# Patient Record
Sex: MALE | Race: WHITE | NOT HISPANIC OR LATINO | Employment: FULL TIME | URBAN - NONMETROPOLITAN AREA
[De-identification: names, ages, dates, MRNs, and addresses within clinical notes are randomized per-mention and may not be internally consistent; named-entity substitution may affect disease eponyms.]

---

## 2017-01-09 ENCOUNTER — APPOINTMENT (OUTPATIENT)
Dept: LAB | Facility: HOSPITAL | Age: 57
End: 2017-01-09
Attending: INTERNAL MEDICINE

## 2017-01-09 ENCOUNTER — TRANSCRIBE ORDERS (OUTPATIENT)
Dept: ADMINISTRATIVE | Facility: HOSPITAL | Age: 57
End: 2017-01-09

## 2017-01-09 DIAGNOSIS — M86.661 OSTEOMYELITIS, CHRONIC, LOWER LEG, RIGHT (HCC): ICD-10-CM

## 2017-01-09 DIAGNOSIS — B95.61 STAPHYLOCOCCUS AUREUS SUPERFICIAL FOLLICULITIS: Primary | ICD-10-CM

## 2017-01-09 DIAGNOSIS — L73.9 STAPHYLOCOCCUS AUREUS SUPERFICIAL FOLLICULITIS: Primary | ICD-10-CM

## 2017-01-09 LAB
ALBUMIN SERPL-MCNC: 4.2 G/DL (ref 3.5–5)
ALBUMIN/GLOB SERPL: 1.2 G/DL (ref 1.1–2.5)
ALP SERPL-CCNC: 72 U/L (ref 24–120)
ALT SERPL W P-5'-P-CCNC: 40 U/L (ref 0–54)
ANION GAP SERPL CALCULATED.3IONS-SCNC: 13 MMOL/L (ref 4–13)
AST SERPL-CCNC: 24 U/L (ref 7–45)
BASOPHILS # BLD AUTO: 0.04 10*3/MM3 (ref 0–0.2)
BASOPHILS NFR BLD AUTO: 0.6 % (ref 0–2)
BILIRUB SERPL-MCNC: 0.8 MG/DL (ref 0.1–1)
BUN BLD-MCNC: 21 MG/DL (ref 5–21)
BUN/CREAT SERPL: 22.1 (ref 7–25)
CALCIUM SPEC-SCNC: 9 MG/DL (ref 8.4–10.4)
CHLORIDE SERPL-SCNC: 102 MMOL/L (ref 98–110)
CO2 SERPL-SCNC: 28 MMOL/L (ref 24–31)
CREAT BLD-MCNC: 0.95 MG/DL (ref 0.5–1.4)
CRP SERPL-MCNC: <0.5 MG/DL (ref 0–0.99)
DEPRECATED RDW RBC AUTO: 45.9 FL (ref 40–54)
EOSINOPHIL # BLD AUTO: 0.12 10*3/MM3 (ref 0–0.7)
EOSINOPHIL NFR BLD AUTO: 1.8 % (ref 0–4)
ERYTHROCYTE [DISTWIDTH] IN BLOOD BY AUTOMATED COUNT: 14.9 % (ref 12–15)
ERYTHROCYTE [SEDIMENTATION RATE] IN BLOOD: 19 MM/HR (ref 0–15)
GFR SERPL CREATININE-BSD FRML MDRD: 82 ML/MIN/1.73
GLOBULIN UR ELPH-MCNC: 3.5 GM/DL
GLUCOSE BLD-MCNC: 87 MG/DL (ref 70–100)
HCT VFR BLD AUTO: 39.8 % (ref 40–52)
HGB BLD-MCNC: 13.5 G/DL (ref 14–18)
IMM GRANULOCYTES # BLD: 0 10*3/MM3 (ref 0–0.03)
IMM GRANULOCYTES NFR BLD: 0 % (ref 0–5)
LYMPHOCYTES # BLD AUTO: 2.24 10*3/MM3 (ref 0.72–4.86)
LYMPHOCYTES NFR BLD AUTO: 34 % (ref 15–45)
MCH RBC QN AUTO: 29.4 PG (ref 28–32)
MCHC RBC AUTO-ENTMCNC: 33.9 G/DL (ref 33–36)
MCV RBC AUTO: 86.7 FL (ref 82–95)
MONOCYTES # BLD AUTO: 0.83 10*3/MM3 (ref 0.19–1.3)
MONOCYTES NFR BLD AUTO: 12.6 % (ref 4–12)
NEUTROPHILS # BLD AUTO: 3.36 10*3/MM3 (ref 1.87–8.4)
NEUTROPHILS NFR BLD AUTO: 51 % (ref 39–78)
PLATELET # BLD AUTO: 214 10*3/MM3 (ref 130–400)
PMV BLD AUTO: 9.5 FL (ref 6–12)
POTASSIUM BLD-SCNC: 4.4 MMOL/L (ref 3.5–5.3)
PROT SERPL-MCNC: 7.7 G/DL (ref 6.3–8.7)
RBC # BLD AUTO: 4.59 10*6/MM3 (ref 4.8–5.9)
SODIUM BLD-SCNC: 143 MMOL/L (ref 135–145)
WBC NRBC COR # BLD: 6.59 10*3/MM3 (ref 4.8–10.8)

## 2017-01-09 PROCEDURE — 86140 C-REACTIVE PROTEIN: CPT | Performed by: INTERNAL MEDICINE

## 2017-01-09 PROCEDURE — 85025 COMPLETE CBC W/AUTO DIFF WBC: CPT | Performed by: INTERNAL MEDICINE

## 2017-01-09 PROCEDURE — 80053 COMPREHEN METABOLIC PANEL: CPT | Performed by: INTERNAL MEDICINE

## 2017-01-09 PROCEDURE — 36415 COLL VENOUS BLD VENIPUNCTURE: CPT | Performed by: INTERNAL MEDICINE

## 2017-01-09 PROCEDURE — 85651 RBC SED RATE NONAUTOMATED: CPT | Performed by: INTERNAL MEDICINE

## 2017-01-30 ENCOUNTER — APPOINTMENT (OUTPATIENT)
Dept: LAB | Facility: HOSPITAL | Age: 57
End: 2017-01-30
Attending: INTERNAL MEDICINE

## 2017-01-30 ENCOUNTER — TRANSCRIBE ORDERS (OUTPATIENT)
Dept: LAB | Facility: HOSPITAL | Age: 57
End: 2017-01-30

## 2017-01-30 DIAGNOSIS — M86.669: ICD-10-CM

## 2017-01-30 DIAGNOSIS — B95.61 STAPHYLOCOCCUS AUREUS SUPERFICIAL FOLLICULITIS: Primary | ICD-10-CM

## 2017-01-30 DIAGNOSIS — L73.9 STAPHYLOCOCCUS AUREUS SUPERFICIAL FOLLICULITIS: Primary | ICD-10-CM

## 2017-01-30 LAB
CRP SERPL-MCNC: <0.5 MG/DL (ref 0–0.99)
DEPRECATED RDW RBC AUTO: 46.1 FL (ref 40–54)
ERYTHROCYTE [DISTWIDTH] IN BLOOD BY AUTOMATED COUNT: 14.8 % (ref 12–15)
ERYTHROCYTE [SEDIMENTATION RATE] IN BLOOD: 11 MM/HR (ref 0–15)
HCT VFR BLD AUTO: 41.9 % (ref 40–52)
HGB BLD-MCNC: 14.6 G/DL (ref 14–18)
MCH RBC QN AUTO: 30 PG (ref 28–32)
MCHC RBC AUTO-ENTMCNC: 34.8 G/DL (ref 33–36)
MCV RBC AUTO: 86.2 FL (ref 82–95)
PLATELET # BLD AUTO: 187 10*3/MM3 (ref 130–400)
PMV BLD AUTO: 10.3 FL (ref 6–12)
RBC # BLD AUTO: 4.86 10*6/MM3 (ref 4.8–5.9)
WBC NRBC COR # BLD: 7.67 10*3/MM3 (ref 4.8–10.8)

## 2017-01-30 PROCEDURE — 36415 COLL VENOUS BLD VENIPUNCTURE: CPT | Performed by: INTERNAL MEDICINE

## 2017-01-30 PROCEDURE — 85651 RBC SED RATE NONAUTOMATED: CPT | Performed by: INTERNAL MEDICINE

## 2017-01-30 PROCEDURE — 85027 COMPLETE CBC AUTOMATED: CPT | Performed by: INTERNAL MEDICINE

## 2017-01-30 PROCEDURE — 86140 C-REACTIVE PROTEIN: CPT | Performed by: INTERNAL MEDICINE

## 2017-04-10 ENCOUNTER — APPOINTMENT (OUTPATIENT)
Dept: LAB | Facility: HOSPITAL | Age: 57
End: 2017-04-10
Attending: INTERNAL MEDICINE

## 2017-04-10 ENCOUNTER — TRANSCRIBE ORDERS (OUTPATIENT)
Dept: ADMINISTRATIVE | Facility: HOSPITAL | Age: 57
End: 2017-04-10

## 2017-04-10 DIAGNOSIS — M86.669: ICD-10-CM

## 2017-04-10 DIAGNOSIS — B95.61 METHICILLIN SUSCEPTIBLE STAPHYLOCOCCUS AUREUS INFECTION AS THE CAUSE OF DISEASES CLASSIFIED ELSEWHERE: Primary | ICD-10-CM

## 2017-04-10 LAB
ALBUMIN SERPL-MCNC: 4.3 G/DL (ref 3.5–5)
ALBUMIN/GLOB SERPL: 1.2 G/DL (ref 1.1–2.5)
ALP SERPL-CCNC: 82 U/L (ref 24–120)
ALT SERPL W P-5'-P-CCNC: 26 U/L (ref 0–54)
ANION GAP SERPL CALCULATED.3IONS-SCNC: 15 MMOL/L (ref 4–13)
AST SERPL-CCNC: 26 U/L (ref 7–45)
BASOPHILS # BLD AUTO: 0.03 10*3/MM3 (ref 0–0.2)
BASOPHILS NFR BLD AUTO: 0.3 % (ref 0–2)
BILIRUB SERPL-MCNC: 0.5 MG/DL (ref 0.1–1)
BUN BLD-MCNC: 21 MG/DL (ref 5–21)
BUN/CREAT SERPL: 23.3 (ref 7–25)
CALCIUM SPEC-SCNC: 9.9 MG/DL (ref 8.4–10.4)
CHLORIDE SERPL-SCNC: 100 MMOL/L (ref 98–110)
CO2 SERPL-SCNC: 24 MMOL/L (ref 24–31)
CREAT BLD-MCNC: 0.9 MG/DL (ref 0.5–1.4)
CRP SERPL-MCNC: <0.5 MG/DL (ref 0–0.99)
DEPRECATED RDW RBC AUTO: 39.3 FL (ref 40–54)
EOSINOPHIL # BLD AUTO: 0.11 10*3/MM3 (ref 0–0.7)
EOSINOPHIL NFR BLD AUTO: 0.9 % (ref 0–4)
ERYTHROCYTE [DISTWIDTH] IN BLOOD BY AUTOMATED COUNT: 12.8 % (ref 12–15)
GFR SERPL CREATININE-BSD FRML MDRD: 87 ML/MIN/1.73
GLOBULIN UR ELPH-MCNC: 3.5 GM/DL
GLUCOSE BLD-MCNC: 96 MG/DL (ref 70–100)
HCT VFR BLD AUTO: 41.9 % (ref 40–52)
HGB BLD-MCNC: 14.9 G/DL (ref 14–18)
IMM GRANULOCYTES # BLD: 0.05 10*3/MM3 (ref 0–0.03)
IMM GRANULOCYTES NFR BLD: 0.4 % (ref 0–5)
LYMPHOCYTES # BLD AUTO: 2.34 10*3/MM3 (ref 0.72–4.86)
LYMPHOCYTES NFR BLD AUTO: 19.5 % (ref 15–45)
MCH RBC QN AUTO: 30.3 PG (ref 28–32)
MCHC RBC AUTO-ENTMCNC: 35.6 G/DL (ref 33–36)
MCV RBC AUTO: 85.2 FL (ref 82–95)
MONOCYTES # BLD AUTO: 1.27 10*3/MM3 (ref 0.19–1.3)
MONOCYTES NFR BLD AUTO: 10.6 % (ref 4–12)
NEUTROPHILS # BLD AUTO: 8.19 10*3/MM3 (ref 1.87–8.4)
NEUTROPHILS NFR BLD AUTO: 68.3 % (ref 39–78)
NRBC BLD MANUAL-RTO: 0 /100 WBC (ref 0–0)
PLATELET # BLD AUTO: 181 10*3/MM3 (ref 130–400)
PMV BLD AUTO: 9.9 FL (ref 6–12)
POTASSIUM BLD-SCNC: 4.4 MMOL/L (ref 3.5–5.3)
PROT SERPL-MCNC: 7.8 G/DL (ref 6.3–8.7)
RBC # BLD AUTO: 4.92 10*6/MM3 (ref 4.8–5.9)
SODIUM BLD-SCNC: 139 MMOL/L (ref 135–145)
WBC NRBC COR # BLD: 11.99 10*3/MM3 (ref 4.8–10.8)

## 2017-04-10 PROCEDURE — 85025 COMPLETE CBC W/AUTO DIFF WBC: CPT | Performed by: INTERNAL MEDICINE

## 2017-04-10 PROCEDURE — 80053 COMPREHEN METABOLIC PANEL: CPT | Performed by: INTERNAL MEDICINE

## 2017-04-10 PROCEDURE — 86140 C-REACTIVE PROTEIN: CPT | Performed by: INTERNAL MEDICINE

## 2017-04-10 PROCEDURE — 36415 COLL VENOUS BLD VENIPUNCTURE: CPT | Performed by: INTERNAL MEDICINE

## 2017-08-14 ENCOUNTER — TRANSCRIBE ORDERS (OUTPATIENT)
Dept: ADMINISTRATIVE | Facility: HOSPITAL | Age: 57
End: 2017-08-14

## 2017-08-14 ENCOUNTER — APPOINTMENT (OUTPATIENT)
Dept: LAB | Facility: HOSPITAL | Age: 57
End: 2017-08-14
Attending: INTERNAL MEDICINE

## 2017-08-14 ENCOUNTER — HOSPITAL ENCOUNTER (OUTPATIENT)
Dept: GENERAL RADIOLOGY | Facility: HOSPITAL | Age: 57
Discharge: HOME | End: 2017-08-14
Attending: INTERNAL MEDICINE | Admitting: INTERNAL MEDICINE

## 2017-08-14 ENCOUNTER — HOSPITAL ENCOUNTER (OUTPATIENT)
Dept: GENERAL RADIOLOGY | Facility: HOSPITAL | Age: 57
Discharge: HOME | End: 2017-08-14

## 2017-08-14 DIAGNOSIS — B95.61 STAPHYLOCOCCUS AUREUS SUPERFICIAL FOLLICULITIS: Primary | ICD-10-CM

## 2017-08-14 DIAGNOSIS — M86.661 OSTEOMYELITIS, CHRONIC, LOWER LEG, RIGHT (HCC): ICD-10-CM

## 2017-08-14 DIAGNOSIS — L73.9 STAPHYLOCOCCUS AUREUS SUPERFICIAL FOLLICULITIS: Primary | ICD-10-CM

## 2017-08-14 LAB
ALBUMIN SERPL-MCNC: 4.6 G/DL (ref 3.5–5)
ALBUMIN/GLOB SERPL: 1.5 G/DL (ref 1.1–2.5)
ALP SERPL-CCNC: 81 U/L (ref 24–120)
ALT SERPL W P-5'-P-CCNC: 84 U/L (ref 0–54)
ANION GAP SERPL CALCULATED.3IONS-SCNC: 11 MMOL/L (ref 4–13)
AST SERPL-CCNC: 40 U/L (ref 7–45)
BILIRUB SERPL-MCNC: 0.6 MG/DL (ref 0.1–1)
BUN BLD-MCNC: 18 MG/DL (ref 5–21)
BUN/CREAT SERPL: 19.8 (ref 7–25)
CALCIUM SPEC-SCNC: 9.7 MG/DL (ref 8.4–10.4)
CHLORIDE SERPL-SCNC: 104 MMOL/L (ref 98–110)
CO2 SERPL-SCNC: 26 MMOL/L (ref 24–31)
CREAT BLD-MCNC: 0.91 MG/DL (ref 0.5–1.4)
CRP SERPL-MCNC: <0.5 MG/DL (ref 0–0.99)
DEPRECATED RDW RBC AUTO: 43.3 FL (ref 40–54)
ERYTHROCYTE [DISTWIDTH] IN BLOOD BY AUTOMATED COUNT: 13.6 % (ref 12–15)
ERYTHROCYTE [SEDIMENTATION RATE] IN BLOOD: 2 MM/HR (ref 0–15)
GFR SERPL CREATININE-BSD FRML MDRD: 86 ML/MIN/1.73
GLOBULIN UR ELPH-MCNC: 3 GM/DL
GLUCOSE BLD-MCNC: 86 MG/DL (ref 70–100)
HCT VFR BLD AUTO: 41.4 % (ref 40–52)
HGB BLD-MCNC: 13.9 G/DL (ref 14–18)
MCH RBC QN AUTO: 29.3 PG (ref 28–32)
MCHC RBC AUTO-ENTMCNC: 33.6 G/DL (ref 33–36)
MCV RBC AUTO: 87.3 FL (ref 82–95)
PLATELET # BLD AUTO: 179 10*3/MM3 (ref 130–400)
PMV BLD AUTO: 9.9 FL (ref 6–12)
POTASSIUM BLD-SCNC: 4.8 MMOL/L (ref 3.5–5.3)
PROT SERPL-MCNC: 7.6 G/DL (ref 6.3–8.7)
RBC # BLD AUTO: 4.74 10*6/MM3 (ref 4.8–5.9)
SODIUM BLD-SCNC: 141 MMOL/L (ref 135–145)
WBC NRBC COR # BLD: 7.81 10*3/MM3 (ref 4.8–10.8)

## 2017-08-14 PROCEDURE — 73590 X-RAY EXAM OF LOWER LEG: CPT

## 2017-08-14 PROCEDURE — 85027 COMPLETE CBC AUTOMATED: CPT | Performed by: INTERNAL MEDICINE

## 2017-08-14 PROCEDURE — 86140 C-REACTIVE PROTEIN: CPT | Performed by: INTERNAL MEDICINE

## 2017-08-14 PROCEDURE — 85651 RBC SED RATE NONAUTOMATED: CPT | Performed by: INTERNAL MEDICINE

## 2017-08-14 PROCEDURE — 36415 COLL VENOUS BLD VENIPUNCTURE: CPT | Performed by: INTERNAL MEDICINE

## 2017-08-14 PROCEDURE — 80053 COMPREHEN METABOLIC PANEL: CPT | Performed by: INTERNAL MEDICINE

## 2017-11-14 ENCOUNTER — TRANSCRIBE ORDERS (OUTPATIENT)
Dept: ADMINISTRATIVE | Facility: HOSPITAL | Age: 57
End: 2017-11-14

## 2017-11-14 ENCOUNTER — APPOINTMENT (OUTPATIENT)
Dept: LAB | Facility: HOSPITAL | Age: 57
End: 2017-11-14
Attending: INTERNAL MEDICINE

## 2017-11-14 DIAGNOSIS — L73.9 STAPHYLOCOCCUS AUREUS SUPERFICIAL FOLLICULITIS: Primary | ICD-10-CM

## 2017-11-14 DIAGNOSIS — M86.661 OSTEOMYELITIS, CHRONIC, LOWER LEG, RIGHT (HCC): ICD-10-CM

## 2017-11-14 DIAGNOSIS — B95.61 STAPHYLOCOCCUS AUREUS SUPERFICIAL FOLLICULITIS: Primary | ICD-10-CM

## 2017-11-14 LAB
ALBUMIN SERPL-MCNC: 4.5 G/DL (ref 3.5–5)
ALBUMIN/GLOB SERPL: 1.5 G/DL (ref 1.1–2.5)
ALP SERPL-CCNC: 73 U/L (ref 24–120)
ALT SERPL W P-5'-P-CCNC: 62 U/L (ref 0–54)
ANION GAP SERPL CALCULATED.3IONS-SCNC: 12 MMOL/L (ref 4–13)
AST SERPL-CCNC: 30 U/L (ref 7–45)
BASOPHILS # BLD AUTO: 0.04 10*3/MM3 (ref 0–0.2)
BASOPHILS NFR BLD AUTO: 0.6 % (ref 0–2)
BILIRUB SERPL-MCNC: 0.4 MG/DL (ref 0.1–1)
BUN BLD-MCNC: 22 MG/DL (ref 5–21)
BUN/CREAT SERPL: 23.7 (ref 7–25)
CALCIUM SPEC-SCNC: 9.6 MG/DL (ref 8.4–10.4)
CHLORIDE SERPL-SCNC: 102 MMOL/L (ref 98–110)
CO2 SERPL-SCNC: 30 MMOL/L (ref 24–31)
CREAT BLD-MCNC: 0.93 MG/DL (ref 0.5–1.4)
CRP SERPL-MCNC: <0.5 MG/DL (ref 0–0.99)
DEPRECATED RDW RBC AUTO: 43.2 FL (ref 40–54)
EOSINOPHIL # BLD AUTO: 0.2 10*3/MM3 (ref 0–0.7)
EOSINOPHIL NFR BLD AUTO: 2.8 % (ref 0–4)
ERYTHROCYTE [DISTWIDTH] IN BLOOD BY AUTOMATED COUNT: 13.6 % (ref 12–15)
GFR SERPL CREATININE-BSD FRML MDRD: 84 ML/MIN/1.73
GLOBULIN UR ELPH-MCNC: 3.1 GM/DL
GLUCOSE BLD-MCNC: 79 MG/DL (ref 70–100)
HCT VFR BLD AUTO: 41.6 % (ref 40–52)
HGB BLD-MCNC: 14.1 G/DL (ref 14–18)
IMM GRANULOCYTES # BLD: 0.02 10*3/MM3 (ref 0–0.03)
IMM GRANULOCYTES NFR BLD: 0.3 % (ref 0–5)
LYMPHOCYTES # BLD AUTO: 2.64 10*3/MM3 (ref 0.72–4.86)
LYMPHOCYTES NFR BLD AUTO: 36.7 % (ref 15–45)
MCH RBC QN AUTO: 29.7 PG (ref 28–32)
MCHC RBC AUTO-ENTMCNC: 33.9 G/DL (ref 33–36)
MCV RBC AUTO: 87.6 FL (ref 82–95)
MONOCYTES # BLD AUTO: 0.82 10*3/MM3 (ref 0.19–1.3)
MONOCYTES NFR BLD AUTO: 11.4 % (ref 4–12)
NEUTROPHILS # BLD AUTO: 3.48 10*3/MM3 (ref 1.87–8.4)
NEUTROPHILS NFR BLD AUTO: 48.2 % (ref 39–78)
PLATELET # BLD AUTO: 184 10*3/MM3 (ref 130–400)
PMV BLD AUTO: 10 FL (ref 6–12)
POTASSIUM BLD-SCNC: 4.8 MMOL/L (ref 3.5–5.3)
PROT SERPL-MCNC: 7.6 G/DL (ref 6.3–8.7)
RBC # BLD AUTO: 4.75 10*6/MM3 (ref 4.8–5.9)
SODIUM BLD-SCNC: 144 MMOL/L (ref 135–145)
WBC NRBC COR # BLD: 7.2 10*3/MM3 (ref 4.8–10.8)

## 2017-11-14 PROCEDURE — 85025 COMPLETE CBC W/AUTO DIFF WBC: CPT | Performed by: INTERNAL MEDICINE

## 2017-11-14 PROCEDURE — 36415 COLL VENOUS BLD VENIPUNCTURE: CPT

## 2017-11-14 PROCEDURE — 86140 C-REACTIVE PROTEIN: CPT | Performed by: INTERNAL MEDICINE

## 2017-11-14 PROCEDURE — 80053 COMPREHEN METABOLIC PANEL: CPT | Performed by: INTERNAL MEDICINE

## 2018-03-19 ENCOUNTER — APPOINTMENT (OUTPATIENT)
Dept: LAB | Facility: HOSPITAL | Age: 58
End: 2018-03-19
Attending: INTERNAL MEDICINE

## 2018-03-19 ENCOUNTER — TRANSCRIBE ORDERS (OUTPATIENT)
Dept: LAB | Facility: HOSPITAL | Age: 58
End: 2018-03-19

## 2018-03-19 DIAGNOSIS — L73.9 STAPHYLOCOCCUS AUREUS SUPERFICIAL FOLLICULITIS: Primary | ICD-10-CM

## 2018-03-19 DIAGNOSIS — B95.61 STAPHYLOCOCCUS AUREUS SUPERFICIAL FOLLICULITIS: Primary | ICD-10-CM

## 2018-03-19 DIAGNOSIS — M86.661 OSTEOMYELITIS, CHRONIC, LOWER LEG, RIGHT (HCC): ICD-10-CM

## 2018-03-19 LAB
ALBUMIN SERPL-MCNC: 4.4 G/DL (ref 3.5–5)
ALBUMIN/GLOB SERPL: 1.2 G/DL (ref 1.1–2.5)
ALP SERPL-CCNC: 76 U/L (ref 24–120)
ALT SERPL W P-5'-P-CCNC: 31 U/L (ref 0–54)
ANION GAP SERPL CALCULATED.3IONS-SCNC: 15 MMOL/L (ref 4–13)
AST SERPL-CCNC: 26 U/L (ref 7–45)
BASOPHILS # BLD AUTO: 0.05 10*3/MM3 (ref 0–0.2)
BASOPHILS NFR BLD AUTO: 0.5 % (ref 0–2)
BILIRUB SERPL-MCNC: 0.4 MG/DL (ref 0.1–1)
BUN BLD-MCNC: 19 MG/DL (ref 5–21)
BUN/CREAT SERPL: 21.6 (ref 7–25)
CALCIUM SPEC-SCNC: 9.5 MG/DL (ref 8.4–10.4)
CHLORIDE SERPL-SCNC: 100 MMOL/L (ref 98–110)
CO2 SERPL-SCNC: 29 MMOL/L (ref 24–31)
CREAT BLD-MCNC: 0.88 MG/DL (ref 0.5–1.4)
CRP SERPL-MCNC: <0.5 MG/DL (ref 0–0.99)
DEPRECATED RDW RBC AUTO: 41.7 FL (ref 40–54)
EOSINOPHIL # BLD AUTO: 0.11 10*3/MM3 (ref 0–0.7)
EOSINOPHIL NFR BLD AUTO: 1.2 % (ref 0–4)
ERYTHROCYTE [DISTWIDTH] IN BLOOD BY AUTOMATED COUNT: 13.5 % (ref 12–15)
ERYTHROCYTE [SEDIMENTATION RATE] IN BLOOD: 17 MM/HR (ref 0–15)
GFR SERPL CREATININE-BSD FRML MDRD: 89 ML/MIN/1.73
GLOBULIN UR ELPH-MCNC: 3.6 GM/DL
GLUCOSE BLD-MCNC: 98 MG/DL (ref 70–100)
HCT VFR BLD AUTO: 41.5 % (ref 40–52)
HGB BLD-MCNC: 13.9 G/DL (ref 14–18)
IMM GRANULOCYTES # BLD: 0.03 10*3/MM3 (ref 0–0.03)
IMM GRANULOCYTES NFR BLD: 0.3 % (ref 0–5)
LYMPHOCYTES # BLD AUTO: 2.62 10*3/MM3 (ref 0.72–4.86)
LYMPHOCYTES NFR BLD AUTO: 27.5 % (ref 15–45)
MCH RBC QN AUTO: 28.3 PG (ref 28–32)
MCHC RBC AUTO-ENTMCNC: 33.5 G/DL (ref 33–36)
MCV RBC AUTO: 84.3 FL (ref 82–95)
MONOCYTES # BLD AUTO: 0.82 10*3/MM3 (ref 0.19–1.3)
MONOCYTES NFR BLD AUTO: 8.6 % (ref 4–12)
NEUTROPHILS # BLD AUTO: 5.89 10*3/MM3 (ref 1.87–8.4)
NEUTROPHILS NFR BLD AUTO: 61.9 % (ref 39–78)
NRBC BLD MANUAL-RTO: 0 /100 WBC (ref 0–0)
PLATELET # BLD AUTO: 198 10*3/MM3 (ref 130–400)
PMV BLD AUTO: 9.5 FL (ref 6–12)
POTASSIUM BLD-SCNC: 4.5 MMOL/L (ref 3.5–5.3)
PROT SERPL-MCNC: 8 G/DL (ref 6.3–8.7)
RBC # BLD AUTO: 4.92 10*6/MM3 (ref 4.8–5.9)
SODIUM BLD-SCNC: 144 MMOL/L (ref 135–145)
WBC NRBC COR # BLD: 9.52 10*3/MM3 (ref 4.8–10.8)

## 2018-03-19 PROCEDURE — 85025 COMPLETE CBC W/AUTO DIFF WBC: CPT | Performed by: INTERNAL MEDICINE

## 2018-03-19 PROCEDURE — 86140 C-REACTIVE PROTEIN: CPT | Performed by: INTERNAL MEDICINE

## 2018-03-19 PROCEDURE — 80053 COMPREHEN METABOLIC PANEL: CPT | Performed by: INTERNAL MEDICINE

## 2018-03-19 PROCEDURE — 85651 RBC SED RATE NONAUTOMATED: CPT | Performed by: INTERNAL MEDICINE

## 2018-03-19 PROCEDURE — 36415 COLL VENOUS BLD VENIPUNCTURE: CPT

## 2018-07-16 ENCOUNTER — TRANSCRIBE ORDERS (OUTPATIENT)
Dept: ADMINISTRATIVE | Facility: HOSPITAL | Age: 58
End: 2018-07-16

## 2018-07-16 ENCOUNTER — APPOINTMENT (OUTPATIENT)
Dept: LAB | Facility: HOSPITAL | Age: 58
End: 2018-07-16
Attending: INTERNAL MEDICINE

## 2018-07-16 DIAGNOSIS — M86.661 OSTEOMYELITIS, CHRONIC, LOWER LEG, RIGHT (HCC): ICD-10-CM

## 2018-07-16 DIAGNOSIS — B95.61 STAPHYLOCOCCUS AUREUS SUPERFICIAL FOLLICULITIS: Primary | ICD-10-CM

## 2018-07-16 DIAGNOSIS — L73.9 STAPHYLOCOCCUS AUREUS SUPERFICIAL FOLLICULITIS: Primary | ICD-10-CM

## 2018-07-16 LAB
ALBUMIN SERPL-MCNC: 4.4 G/DL (ref 3.5–5)
ALBUMIN/GLOB SERPL: 1.5 G/DL (ref 1.1–2.5)
ALP SERPL-CCNC: 89 U/L (ref 24–120)
ALT SERPL W P-5'-P-CCNC: 31 U/L (ref 0–54)
ANION GAP SERPL CALCULATED.3IONS-SCNC: 15 MMOL/L (ref 4–13)
AST SERPL-CCNC: 25 U/L (ref 7–45)
BASOPHILS # BLD AUTO: 0.05 10*3/MM3 (ref 0–0.2)
BASOPHILS NFR BLD AUTO: 0.6 % (ref 0–2)
BILIRUB SERPL-MCNC: 0.5 MG/DL (ref 0.1–1)
BUN BLD-MCNC: 13 MG/DL (ref 5–21)
BUN/CREAT SERPL: 14.1 (ref 7–25)
CALCIUM SPEC-SCNC: 9.5 MG/DL (ref 8.4–10.4)
CHLORIDE SERPL-SCNC: 103 MMOL/L (ref 98–110)
CO2 SERPL-SCNC: 26 MMOL/L (ref 24–31)
CREAT BLD-MCNC: 0.92 MG/DL (ref 0.5–1.4)
DEPRECATED RDW RBC AUTO: 42 FL (ref 40–54)
EOSINOPHIL # BLD AUTO: 0.16 10*3/MM3 (ref 0–0.7)
EOSINOPHIL NFR BLD AUTO: 1.9 % (ref 0–4)
ERYTHROCYTE [DISTWIDTH] IN BLOOD BY AUTOMATED COUNT: 13.7 % (ref 12–15)
GFR SERPL CREATININE-BSD FRML MDRD: 84 ML/MIN/1.73
GLOBULIN UR ELPH-MCNC: 3 GM/DL
GLUCOSE BLD-MCNC: 90 MG/DL (ref 70–100)
HCT VFR BLD AUTO: 41.5 % (ref 40–52)
HGB BLD-MCNC: 13.8 G/DL (ref 14–18)
IMM GRANULOCYTES # BLD: 0.04 10*3/MM3 (ref 0–0.03)
IMM GRANULOCYTES NFR BLD: 0.5 % (ref 0–5)
LYMPHOCYTES # BLD AUTO: 2.76 10*3/MM3 (ref 0.72–4.86)
LYMPHOCYTES NFR BLD AUTO: 32.4 % (ref 15–45)
MCH RBC QN AUTO: 28 PG (ref 28–32)
MCHC RBC AUTO-ENTMCNC: 33.3 G/DL (ref 33–36)
MCV RBC AUTO: 84.3 FL (ref 82–95)
MONOCYTES # BLD AUTO: 0.82 10*3/MM3 (ref 0.19–1.3)
MONOCYTES NFR BLD AUTO: 9.6 % (ref 4–12)
NEUTROPHILS # BLD AUTO: 4.69 10*3/MM3 (ref 1.87–8.4)
NEUTROPHILS NFR BLD AUTO: 55 % (ref 39–78)
NRBC BLD MANUAL-RTO: 0 /100 WBC (ref 0–0)
PLATELET # BLD AUTO: 206 10*3/MM3 (ref 130–400)
PMV BLD AUTO: 9.9 FL (ref 6–12)
POTASSIUM BLD-SCNC: 4.4 MMOL/L (ref 3.5–5.3)
PROT SERPL-MCNC: 7.4 G/DL (ref 6.3–8.7)
RBC # BLD AUTO: 4.92 10*6/MM3 (ref 4.8–5.9)
SODIUM BLD-SCNC: 144 MMOL/L (ref 135–145)
WBC NRBC COR # BLD: 8.52 10*3/MM3 (ref 4.8–10.8)

## 2018-07-16 PROCEDURE — 80053 COMPREHEN METABOLIC PANEL: CPT | Performed by: INTERNAL MEDICINE

## 2018-07-16 PROCEDURE — 36415 COLL VENOUS BLD VENIPUNCTURE: CPT

## 2018-07-16 PROCEDURE — 85025 COMPLETE CBC W/AUTO DIFF WBC: CPT | Performed by: INTERNAL MEDICINE

## 2019-01-07 ENCOUNTER — TRANSCRIBE ORDERS (OUTPATIENT)
Dept: ADMINISTRATIVE | Facility: HOSPITAL | Age: 59
End: 2019-01-07

## 2019-01-07 ENCOUNTER — APPOINTMENT (OUTPATIENT)
Dept: LAB | Facility: HOSPITAL | Age: 59
End: 2019-01-07
Attending: INTERNAL MEDICINE

## 2019-01-07 DIAGNOSIS — M86.661 OSTEOMYELITIS, CHRONIC, LOWER LEG, RIGHT (HCC): ICD-10-CM
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
## 2019-01-07 DIAGNOSIS — B95.61 HEALTHCARE ASSOCIATED BACTEREMIA DUE TO STAPHYLOCOCCUS AUREUS: Primary | ICD-10-CM

## 2019-01-07 DIAGNOSIS — R78.81 HEALTHCARE ASSOCIATED BACTEREMIA DUE TO STAPHYLOCOCCUS AUREUS: Primary | ICD-10-CM

## 2019-01-07 LAB
ALBUMIN SERPL-MCNC: 4.5 G/DL (ref 3.5–5)
ALBUMIN/GLOB SERPL: 1.3 G/DL (ref 1.1–2.5)
ALP SERPL-CCNC: 77 U/L (ref 24–120)
ALT SERPL W P-5'-P-CCNC: 29 U/L (ref 0–54)
ANION GAP SERPL CALCULATED.3IONS-SCNC: 9 MMOL/L (ref 4–13)
AST SERPL-CCNC: 30 U/L (ref 7–45)
BILIRUB SERPL-MCNC: 0.4 MG/DL (ref 0.1–1)
BUN BLD-MCNC: 18 MG/DL (ref 5–21)
BUN/CREAT SERPL: 20 (ref 7–25)
CALCIUM SPEC-SCNC: 9.7 MG/DL (ref 8.4–10.4)
CHLORIDE SERPL-SCNC: 100 MMOL/L (ref 98–110)
CO2 SERPL-SCNC: 30 MMOL/L (ref 24–31)
CREAT BLD-MCNC: 0.9 MG/DL (ref 0.5–1.4)
CRP SERPL-MCNC: <0.5 MG/DL (ref 0–0.99)
DEPRECATED RDW RBC AUTO: 41.2 FL (ref 40–54)
ERYTHROCYTE [DISTWIDTH] IN BLOOD BY AUTOMATED COUNT: 13.2 % (ref 12–15)
ERYTHROCYTE [SEDIMENTATION RATE] IN BLOOD: 4 MM/HR (ref 0–15)
GFR SERPL CREATININE-BSD FRML MDRD: 86 ML/MIN/1.73
GLOBULIN UR ELPH-MCNC: 3.4 GM/DL
GLUCOSE BLD-MCNC: 81 MG/DL (ref 70–100)
HCT VFR BLD AUTO: 43.7 % (ref 40–52)
HGB BLD-MCNC: 15 G/DL (ref 14–18)
MCH RBC QN AUTO: 29.4 PG (ref 28–32)
MCHC RBC AUTO-ENTMCNC: 34.3 G/DL (ref 33–36)
MCV RBC AUTO: 85.7 FL (ref 82–95)
PLATELET # BLD AUTO: 202 10*3/MM3 (ref 130–400)
PMV BLD AUTO: 9.7 FL (ref 6–12)
POTASSIUM BLD-SCNC: 4.4 MMOL/L (ref 3.5–5.3)
PROT SERPL-MCNC: 7.9 G/DL (ref 6.3–8.7)
RBC # BLD AUTO: 5.1 10*6/MM3 (ref 4.8–5.9)
SODIUM BLD-SCNC: 139 MMOL/L (ref 135–145)
WBC NRBC COR # BLD: 8.82 10*3/MM3 (ref 4.8–10.8)

## 2019-01-07 PROCEDURE — 80053 COMPREHEN METABOLIC PANEL: CPT | Performed by: INTERNAL MEDICINE

## 2019-01-07 PROCEDURE — 36415 COLL VENOUS BLD VENIPUNCTURE: CPT

## 2019-01-07 PROCEDURE — 85651 RBC SED RATE NONAUTOMATED: CPT | Performed by: INTERNAL MEDICINE

## 2019-01-07 PROCEDURE — 85027 COMPLETE CBC AUTOMATED: CPT | Performed by: INTERNAL MEDICINE

## 2019-01-07 PROCEDURE — 86140 C-REACTIVE PROTEIN: CPT | Performed by: INTERNAL MEDICINE

## 2023-12-11 ENCOUNTER — TELEPHONE (OUTPATIENT)
Age: 63
End: 2023-12-11
Payer: COMMERCIAL

## 2023-12-11 ENCOUNTER — LAB (OUTPATIENT)
Dept: LAB | Facility: HOSPITAL | Age: 63
End: 2023-12-11
Payer: COMMERCIAL

## 2023-12-11 DIAGNOSIS — M86.461 CHRONIC OSTEOMYELITIS OF RIGHT TIBIA WITH DRAINING SINUS: ICD-10-CM

## 2023-12-11 DIAGNOSIS — M86.461 CHRONIC OSTEOMYELITIS OF RIGHT TIBIA WITH DRAINING SINUS: Primary | ICD-10-CM

## 2023-12-11 LAB
ALBUMIN SERPL-MCNC: 4.2 G/DL (ref 3.5–5)
ALBUMIN/GLOB SERPL: 1.3 G/DL (ref 1.1–2.5)
ALP SERPL-CCNC: 97 U/L (ref 24–120)
ALT SERPL W P-5'-P-CCNC: 31 U/L (ref 0–50)
ANION GAP SERPL CALCULATED.3IONS-SCNC: 10 MMOL/L (ref 4–13)
AST SERPL-CCNC: 24 U/L (ref 7–45)
AUTO MIXED CELLS #: 0.5 10*3/MM3 (ref 0.1–2.6)
AUTO MIXED CELLS %: 5.9 % (ref 0.1–24)
BILIRUB SERPL-MCNC: 0.7 MG/DL (ref 0.1–1)
BUN SERPL-MCNC: 17 MG/DL (ref 5–21)
BUN/CREAT SERPL: 17.2
CALCIUM SPEC-SCNC: 9.2 MG/DL (ref 8.4–10.4)
CHLORIDE SERPL-SCNC: 100 MMOL/L (ref 98–110)
CO2 SERPL-SCNC: 26 MMOL/L (ref 24–31)
CREAT SERPL-MCNC: 0.99 MG/DL (ref 0.5–1.4)
EGFRCR SERPLBLD CKD-EPI 2021: 85.1 ML/MIN/1.73
ERYTHROCYTE [DISTWIDTH] IN BLOOD BY AUTOMATED COUNT: 13.5 % (ref 12.3–15.4)
GLOBULIN UR ELPH-MCNC: 3.3 GM/DL
GLUCOSE SERPL-MCNC: 174 MG/DL (ref 70–100)
HCT VFR BLD AUTO: 41.6 % (ref 37.5–51)
HGB BLD-MCNC: 13.5 G/DL (ref 13–17.7)
LYMPHOCYTES # BLD AUTO: 2.8 10*3/MM3 (ref 0.7–3.1)
LYMPHOCYTES NFR BLD AUTO: 33.5 % (ref 19.6–45.3)
MCH RBC QN AUTO: 28.4 PG (ref 26.6–33)
MCHC RBC AUTO-ENTMCNC: 32.5 G/DL (ref 31.5–35.7)
MCV RBC AUTO: 87.4 FL (ref 79–97)
NEUTROPHILS NFR BLD AUTO: 5 10*3/MM3 (ref 1.7–7)
NEUTROPHILS NFR BLD AUTO: 60.6 % (ref 42.7–76)
PLATELET # BLD AUTO: 210 10*3/MM3 (ref 140–450)
PMV BLD AUTO: 8 FL (ref 6–12)
POTASSIUM SERPL-SCNC: 3.8 MMOL/L (ref 3.5–5.3)
PROT SERPL-MCNC: 7.5 G/DL (ref 6.3–8.7)
RBC # BLD AUTO: 4.76 10*6/MM3 (ref 4.14–5.8)
SODIUM SERPL-SCNC: 136 MMOL/L (ref 135–145)
WBC NRBC COR # BLD AUTO: 8.3 10*3/MM3 (ref 3.4–10.8)

## 2023-12-11 PROCEDURE — 36415 COLL VENOUS BLD VENIPUNCTURE: CPT

## 2023-12-11 PROCEDURE — 86140 C-REACTIVE PROTEIN: CPT

## 2023-12-11 PROCEDURE — 85025 COMPLETE CBC W/AUTO DIFF WBC: CPT

## 2023-12-11 PROCEDURE — 80053 COMPREHEN METABOLIC PANEL: CPT

## 2023-12-11 NOTE — TELEPHONE ENCOUNTER
Patient called stating that he was at [facility] for labs. He stated that they did not have orders I told him that I would send orders.

## 2023-12-12 LAB — CRP SERPL-MCNC: 0.38 MG/DL (ref 0–0.5)

## 2023-12-14 ENCOUNTER — OFFICE VISIT (OUTPATIENT)
Age: 63
End: 2023-12-14
Payer: COMMERCIAL

## 2023-12-14 ENCOUNTER — HOSPITAL ENCOUNTER (OUTPATIENT)
Dept: CARDIOLOGY | Facility: HOSPITAL | Age: 63
Discharge: HOME | End: 2023-12-14
Payer: COMMERCIAL

## 2023-12-14 VITALS
SYSTOLIC BLOOD PRESSURE: 142 MMHG | HEART RATE: 48 BPM | DIASTOLIC BLOOD PRESSURE: 72 MMHG | OXYGEN SATURATION: 98 % | HEIGHT: 68 IN | BODY MASS INDEX: 34.83 KG/M2 | WEIGHT: 229.8 LBS | TEMPERATURE: 98.7 F

## 2023-12-14 DIAGNOSIS — I49.9 IRREGULAR HEARTBEAT: ICD-10-CM

## 2023-12-14 DIAGNOSIS — M86.461 CHRONIC OSTEOMYELITIS OF RIGHT TIBIA WITH DRAINING SINUS: Primary | ICD-10-CM

## 2023-12-14 PROBLEM — I10 HYPERTENSIVE DISORDER: Status: ACTIVE | Noted: 2023-07-27

## 2023-12-14 PROCEDURE — 93005 ELECTROCARDIOGRAM TRACING: CPT | Performed by: INTERNAL MEDICINE

## 2023-12-14 RX ORDER — CEPHALEXIN 500 MG/1
2 CAPSULE ORAL EVERY 12 HOURS
COMMUNITY
End: 2023-12-14 | Stop reason: SDUPTHER

## 2023-12-14 RX ORDER — ASCORBIC ACID 500 MG
500 TABLET ORAL DAILY
COMMUNITY

## 2023-12-14 RX ORDER — CEPHALEXIN 500 MG/1
1000 CAPSULE ORAL EVERY 12 HOURS
Qty: 120 CAPSULE | Refills: 4 | Status: SHIPPED | OUTPATIENT
Start: 2023-12-14 | End: 2024-01-13

## 2023-12-14 RX ORDER — LOSARTAN POTASSIUM 25 MG/1
25 TABLET ORAL DAILY
COMMUNITY

## 2023-12-14 RX ORDER — CETIRIZINE HYDROCHLORIDE 10 MG/1
10 TABLET ORAL DAILY
COMMUNITY

## 2023-12-14 RX ORDER — ASCORBIC ACID 125 MG
1 TABLET,CHEWABLE ORAL DAILY
COMMUNITY

## 2023-12-14 RX ORDER — CYANOCOBALAMIN (VITAMIN B-12) 1000 MCG
1000 TABLET, EXTENDED RELEASE ORAL DAILY
COMMUNITY

## 2023-12-14 NOTE — PROGRESS NOTES
[facility] [place]ious [place]    Patient:  Lemuel Helm  YOB: 1960  MRN: [id number]   Primary Care Physician: Vince Spangler MD  Referring Physician: Vince Spangler MD     Chief Complaint:   Chief Complaint   Patient presents with    chronic right tibial osteomyelitis     With complaints of chronic drainage     Interval History/HPI: He returns today for follow-up of chronic osteomyelitis involving the right mid tibial area.  He indicates the 2 areas that will drain anteriorly persist.  He has not had any new areas develop.  He indicates the most superior incision will drain sometimes.  When that happens typically the lower area of drainage has closed.  He indicates sometimes will then switch in the lower area will drain and the upper area will close.  He has not noticed any increasing redness.  He has not noticed warmth or fluctuance.  He does not seem to have significant pain or discomfort.  Findings on exam and his clinical course seems to be stable on his suppressive treatment with Keflex.  He is tolerating Keflex 1000 mg orally every 12 hours without any nausea, diarrhea, rash, or skin itching.  Today while taking vitals my nurse noticed his heart rate was a little bit irregular.  His heart rate was in the high 40s.  His blood pressure is stable.  He is not having any chest pain.  He has no dizziness or lightheadedness.  He has no orthostatic symptoms.  He has not noticed any palpitations.  He does indicate his mother has a history of heart disease.  He sees Dr. Devi for primary care.    Allergies:   Allergies   Allergen Reactions    Sulfa Antibiotics Rash     Current Scheduled Medications:   Current Outpatient Medications on File Prior to Visit   Medication Sig    ascorbic acid (VITAMIN C) 500 MG tablet Take 1 tablet by mouth Daily.    ASPIRIN 81 PO Take 81 mg by mouth Daily.    cetirizine (zyrTEC) 10 MG tablet Take 1 tablet by mouth Daily.    Cyanocobalamin (Vitamin B-12  "ER) 1000 MCG tablet controlled-release Take 1,000 mcg by mouth Daily.    losartan (COZAAR) 25 MG tablet Take 1 tablet by mouth Daily.    Misc Natural Products (YumVs Beet Root-Tart Cherry) 250-0.5 MG chewable tablet Chew 1 Units Daily.    Naproxen (NAPROSYN PO) Take 1 tablet by mouth Daily As Needed. Takes Monday through Friday    [DISCONTINUED] cephalexin (KEFLEX) 500 MG capsule Take 2 capsules by mouth Every 12 (Twelve) Hours.     No current facility-administered medications on file prior to visit.      Venous Access Review  Line/IV site: No current IV Access    Antimicrobial Review  Currently on antibiotics/antifungals: YES/NO: YES  Start Date of Therapy: Amoxicillin 4/11/17-12/2020  cephalexin 12/20/20 - current   If therapy completed, date complete: NA    Review of Systems See HPI.    Vital Signs:  /72 Comment: manual right arm  Pulse (!) 48   Temp 98.7 °F (37.1 °C) (Temporal)   Ht 172.7 cm (68\")   Wt 104 kg (229 lb 12.8 oz)   SpO2 98%   BMI 34.94 kg/m²     Physical Exam  Vital signs - reviewed.  Alert, pleasant, no distress  When I auscultate his heart it is slightly irregular.  It was hard to tell whether it was an occasional PAC or PAC with intervening periods of regularity.  He could potentially have A-fib.  I could not tell auscultating or palpating his pulse with certainty the nature of the occasional irregularity in his heart rate.  Examination of the right lower extremity shows stable findings from previous exam.  There are 2 areas anteriorly which drained intermittently  There is a minimal amount of serous minimally cloudy drainage on exam today.  This is very stable with past exams.    Lab/Imaging/Other Information:  Reviewed his labs from December 11, 2023 with him.  His CMP, CBC and CRP remain stable.  Comprehensive Metabolic Panel (12/11/2023 14:21)   C-reactive Protein (12/11/2023 14:21)   CBC Auto Differential (12/11/2023 14:21)     Impression & Recommendations:   Diagnoses and all " orders for this visit:    1. Chronic osteomyelitis of right tibia with draining sinus (Primary)  -     CBC Auto Differential; Future  -     Comprehensive Metabolic Panel; Future  -     C-reactive Protein; Future  -     cephalexin (KEFLEX) 500 MG capsule; Take 2 capsules by mouth Every 12 (Twelve) Hours for 30 days.  Dispense: 120 capsule; Refill: 4    2. Irregular heartbeat  -     ECG 12 Lead; Future    From a chronic osteomyelitis standpoint involving the right tibia he seems to be doing very well.  He is tolerating and showing no side effects or problems with Keflex treatment.  Feel the best approach would be to continue his chronic suppression with Keflex.  I feel if we stop his antibiotic therapy that he will have increasing discomfort, purulent drainage, redness, and potentially more draining areas.  We seem to be suppressing any progression in symptoms and holding her own with his current antibiotic treatment.  He does not have any cardiac symptoms.  I cannot discern the nature of his heart rate/heart regularity on bedside exam.  Suggested we send him for a twelve-lead EKG at the hospital and share the results with his primary care provider to make sure there is not a rhythm disturbance of some type that would require different intervention.  He is agreeable.  Continue Keflex.  Lab a few days prior to follow-up.  Follow-up in 6 months.  We will call him with EKG results later today  He will call for an earlier follow-up appointment if any new or worsening symptoms in the meantime.    Follow Up:   Patient Instructions   Continue Keflex 1000 mg q12   Get EKG [facility] at [facility]  Return in about 6 months (around 6/14/2024).  Patient was provided After Visit Summary.     Addendum: 2:20 PM on December 14, 2023:  Now have his ECG available for review.  He is in normal sinus rhythm with a ventricular rate of 72 bpm.  He has an occasional PVC.  There was nonspecific T wave abnormality.  Contacted him by phone.  Explained PVC  was the explanation for the abnormality in heart rhythm we were detecting in the office.  I was trying to make sure he did not have atrial fibrillation that would require different intervention.  Explained I do not feel there is anything different to do at this point in time with these EKG results.  Encouraged him to maybe minimize any caffeine containing products that might offer some additional stimulation.  Told him we would fax a copy of the ECG to his primary care physician's office (Dr. Spangler).  I will be seeing him again in 6 months.  Would be happy to see him sooner if any new or worsening problems in the interim.  He was comfortable with that plan.      Remigio Serrano MD    CC: Vince Spangler MD

## 2023-12-14 NOTE — PATIENT INSTRUCTIONS
Continue Keflex 1000 mg q12   Get EKG today at [facility]

## 2023-12-16 LAB
QT INTERVAL: 376 MS
QTC INTERVAL: 411 MS

## 2024-05-06 ENCOUNTER — TELEPHONE (OUTPATIENT)
Age: 64
End: 2024-05-06
Payer: COMMERCIAL

## 2024-05-06 DIAGNOSIS — M86.461 CHRONIC OSTEOMYELITIS OF RIGHT TIBIA WITH DRAINING SINUS: Primary | ICD-10-CM

## 2024-05-06 RX ORDER — CEPHALEXIN 500 MG/1
1000 CAPSULE ORAL EVERY 12 HOURS
Qty: 120 CAPSULE | Refills: 0 | Status: SHIPPED | OUTPATIENT
Start: 2024-05-06 | End: 2024-06-05

## 2024-06-06 ENCOUNTER — TELEPHONE (OUTPATIENT)
Age: 64
End: 2024-06-06
Payer: COMMERCIAL

## 2024-06-06 DIAGNOSIS — M86.461 CHRONIC OSTEOMYELITIS OF RIGHT TIBIA WITH DRAINING SINUS: Primary | ICD-10-CM

## 2024-06-06 RX ORDER — CEPHALEXIN 500 MG/1
1000 CAPSULE ORAL EVERY 12 HOURS
Qty: 120 CAPSULE | Refills: 0 | Status: SHIPPED | OUTPATIENT
Start: 2024-06-06 | End: 2024-07-06

## 2024-06-06 RX ORDER — CEPHALEXIN 500 MG/1
1000 CAPSULE ORAL EVERY 12 HOURS
Qty: 20 CAPSULE | Refills: 0 | Status: CANCELLED | OUTPATIENT
Start: 2024-06-06 | End: 2024-07-06

## 2024-06-06 NOTE — TELEPHONE ENCOUNTER
Patient called stating he is not going to have enough medication to make it to his appt. He asked that it  be sent to [facility] in [place].    I called Candi @ [facility] in [place] I asked if patient had any refill on his Cephalexin she stated that she did not. I told her that I would send it in .

## 2024-06-12 NOTE — PROGRESS NOTES
[facility] – [place] - Infectious Diseases Progress Note    Patient:  Lemuel Helm  YOB: 1960  MRN: [id number]   Primary Care Physician: Vince Spangler MD  Referring Physician: Vince Spangler MD     Chief Complaint: chronic right tibial osteomyelitis   >>>lower wound will drain a bloody drainage then close every 4-6 months<<<    Interval History/HPI: He returns today for follow-up.  He has chronic right tibial osteomyelitis.  He has done well with chronic suppression with Keflex.  He has 2 areas that would drain intermittently.  There is been no significant change.  He is tolerating the antibiotic treatment without nausea, diarrhea, or rash.  He had laboratory work done on May 13, 2024.  He had a CBC and CMP.  Both were normal.  Discussed those results with him.  He continues to work as a .  He will turn 64 this year.  He may retire within the next few years.    Allergies:   Allergies   Allergen Reactions    Sulfa Antibiotics Rash     Current Scheduled Medications:   Current Outpatient Medications on File Prior to Visit   Medication Sig    ascorbic acid (VITAMIN C) 500 MG tablet Take 1 tablet by mouth Daily.    ASPIRIN 81 PO Take 81 mg by mouth Daily.    cephalexin (KEFLEX) 500 MG capsule Take 2 capsules by mouth Every 12 (Twelve) Hours for 30 days.    cetirizine (zyrTEC) 10 MG tablet Take 1 tablet by mouth Daily.    Cyanocobalamin (Vitamin B-12 ER) 1000 MCG tablet controlled-release Take 1,000 mcg by mouth Daily.    losartan (COZAAR) 25 MG tablet Take 1 tablet by mouth Daily.    Misc Natural Products (YumVs Beet Root-Tart Cherry) 250-0.5 MG chewable tablet Chew 1 Units Daily.    Naproxen (NAPROSYN PO) Take 1 tablet by mouth Daily As Needed. Takes Monday through Friday    Zinc 50 MG tablet Take 1 tablet by mouth Daily.     No current facility-administered medications on file prior to visit.      Venous Access Review  Line/IV site: No current IV Access  Antimicrobial Review  Currently on  "antibiotics/antifungals: YES/NO: YES  Start Date of Therapy: Amoxicillin 4/11/17-12/2020  cephalexin 12/20/20    If therapy completed, date complete: suppression      Review of Systems See HPI.    Vital Signs:  /62 (BP Location: Right arm, Patient Position: Sitting, Cuff Size: Adult)   Pulse 87   Temp 97.9 °F (36.6 °C) (Oral)   Ht 172.7 cm (68\")   Wt 107 kg (236 lb 3.2 oz)   SpO2 98%   BMI 35.91 kg/m²     Physical Exam  Vital signs - reviewed.  Right lower extremity with 2 open areas anterior mid tibial area.  No significant drainage on today's exam.  The lower 1 is slightly open.  There is no erythema to suggest cellulitis.  No tenderness or fluctuance.  No crepitance.    Lab/Imaging/Other Information: CMP and CBC that were done on May 13, 2024 were reviewed.  Both were normal.  Results discussed with him.  His CRP done on May 13, 2024 was normal as well at a level of 4 (reference range 0-10)    Impression & Recommendations:   Diagnoses and all orders for this visit:    1. Chronic osteomyelitis of right tibia with draining sinus (Primary)  -     cephalexin (KEFLEX) 500 MG capsule; Take 2 capsules by mouth Every 12 (Twelve) Hours for 30 days.  Dispense: 120 capsule; Refill: 5  -     CBC Auto Differential; Future  -     Comprehensive Metabolic Panel; Future  -     C-reactive Protein; Future  -     XR Tibia Fibula 2 View Right; Future    Talked with him again today about continuing chronic suppression.  He is seen orthopedic surgeons at the orthopedic [place] and also in [place].  He is aware surgery to try to remove infected bone and get the bone to heal might be challenging.  He is planning to continue chronic suppression at this time.  We have talked previously about risks of developing a squamous cell cancer in the sinus tract.  That would probably be the main risk to him for chronic suppression aside from side effects of antibiotic treatment.  He has been tolerating Keflex for many years now " without any evidence of problem.  At this point he seems most comfortable continuing chronic suppression.  I would agree at present this is the best approach.  I would like to see him back in 6 months.  Would like him to get x-rays of the right tibial area to look for interval change along with CBC and CMP at that time.  He was agreeable.  He will call for earlier appointment if any new or worsening problems in the interim.  I have encouraged him previously to maintain follow-up with orthopedic surgery as well.    Follow Up:   Patient Instructions   Continue Cephalexin 1000 mg  q 12  Keep follow up with Vince Spangler MD  Return in about 6 months (around 12/13/2024).  Patient was provided After Visit Summary.     Remigio Serrano MD    CC: Vince Spangler MD

## 2024-06-13 ENCOUNTER — OFFICE VISIT (OUTPATIENT)
Age: 64
End: 2024-06-13
Payer: COMMERCIAL

## 2024-06-13 VITALS
OXYGEN SATURATION: 98 % | HEART RATE: 87 BPM | BODY MASS INDEX: 35.8 KG/M2 | TEMPERATURE: 97.9 F | WEIGHT: 236.2 LBS | HEIGHT: 68 IN | DIASTOLIC BLOOD PRESSURE: 62 MMHG | SYSTOLIC BLOOD PRESSURE: 152 MMHG

## 2024-06-13 DIAGNOSIS — M86.461 CHRONIC OSTEOMYELITIS OF RIGHT TIBIA WITH DRAINING SINUS: Primary | ICD-10-CM

## 2024-06-13 PROCEDURE — 99214 OFFICE O/P EST MOD 30 MIN: CPT | Performed by: INTERNAL MEDICINE

## 2024-06-13 RX ORDER — GINSENG 100 MG
50 CAPSULE ORAL DAILY
COMMUNITY

## 2024-06-13 RX ORDER — CEPHALEXIN 500 MG/1
1000 CAPSULE ORAL EVERY 12 HOURS
Qty: 120 CAPSULE | Refills: 5 | Status: SHIPPED | OUTPATIENT
Start: 2024-06-13 | End: 2024-07-13

## 2024-08-05 ENCOUNTER — TELEPHONE (OUTPATIENT)
Age: 64
End: 2024-08-05
Payer: COMMERCIAL

## 2024-08-05 NOTE — TELEPHONE ENCOUNTER
Patient called stating that he needs a refill on  his Keflex. I informed him that on 6/13/2024 our office sent  in Keflex 1000mg q12 1 month with 5 refills. I told him that I would call to see what was going on. I told him that I would call him back.    Called [facility] [phone]  spoke with Stuart I informed him that patient called stating that they did not have his Keflex . I told him that on efill on  his Keflex. I informed him that on 6/13/2024 our office sent  in Keflex 1000mg q12 1 month with 5 refills. He stated that it showed he does have refills ( 5) and that there is something going on with the automatic system. I told him that I would inform patient of this.       Called patient informed him of the above he thanked me for my help.

## 2024-11-06 ENCOUNTER — TELEPHONE (OUTPATIENT)
Age: 64
End: 2024-11-06
Payer: COMMERCIAL

## 2024-11-06 NOTE — TELEPHONE ENCOUNTER
Called Mr. Ray because he has an appointment with Dr. Serrano on December 9 that needs to be rescheduled. One of us will call the other at a better time to reschedule.

## 2024-12-06 NOTE — PROGRESS NOTES
[facility] – [place] - Infectious Diseases Progress Note    Patient:  Lemuel Helm  YOB: 1960  MRN: [id number]   Primary Care Physician: Vince Spangler MD  Referring Physician: Vince Spangler MD     Chief Complaint: chronic right tibial osteomyelitis     Interval History/HPI: Here for follow-up.  He is doing well.  He is not having any increasing pain or discomfort in the right tibial area.  He has some pain in the right knee joint which is chronic.  He indicates she has bone-on-bone involving the right knee.  Anticipate sometime potentially needing right knee arthroplasty.  Indicates she has made an appointment with Dr. Gold in February to discuss options for management of the right tibial process and also his right knee.  Continues to tolerate Keflex without nausea, diarrhea, or rash.  He periodically gets a little bit of drainage from the mid tibial area.  He had recent x-rays.  Results of labs and x-rays were discussed with him.  They are outlined below.    Allergies:   Allergies   Allergen Reactions    Sulfa Antibiotics Rash     Current Scheduled Medications:   Current Outpatient Medications on File Prior to Visit   Medication Sig    ascorbic acid (VITAMIN C) 500 MG tablet Take 1 tablet by mouth Daily.    ASPIRIN 81 PO Take 81 mg by mouth Daily.    cephalexin (KEFLEX) 500 MG capsule Take 2 capsules by mouth Every 12 (Twelve) Hours.    Cyanocobalamin (Vitamin B-12 ER) 1000 MCG tablet controlled-release Take 1,000 mcg by mouth Daily.    losartan (COZAAR) 25 MG tablet Take 1 tablet by mouth Daily. (Patient taking differently: Take 2 tablets by mouth Daily.)    Naproxen (NAPROSYN PO) Take 1 tablet by mouth Daily As Needed. Takes Monday through Friday (Patient taking differently: Take 1 tablet by mouth Every Other Day. Takes Monday through Friday)    cetirizine (zyrTEC) 10 MG tablet Take 1 tablet by mouth Daily. (Patient not taking: Reported on 12/11/2024)    Garlic 1000 MG capsule Take 1 capsule by mouth  "Daily.    hydroCHLOROthiazide 12.5 MG tablet Take 1 tablet by mouth Daily.    Misc Natural Products (YumVs Beet Root-Tart Cherry) 250-0.5 MG chewable tablet Chew 1 Units Daily. (Patient not taking: Reported on 12/11/2024)    Zinc 50 MG tablet Take 1 tablet by mouth Daily. (Patient not taking: Reported on 12/11/2024)     No current facility-administered medications on file prior to visit.      Venous Access Review  Line/IV site: No current IV Access  Antimicrobial Review  Currently on antibiotics/antifungals: YES/NO: YES  Start Date of Therapy:Amoxicillin 4/11/17-12/2020  cephalexin 12/20/20    If therapy completed, date complete: suppression     Review of Systems See HPI.    Vital Signs:  /87 (BP Location: Left arm, Patient Position: Sitting, Cuff Size: Large Adult)   Pulse 75   Temp 98 °F (36.7 °C) (Oral)   Ht 172.7 cm (68\")   Wt 106 kg (233 lb 3.2 oz)   SpO2 97%   BMI 35.46 kg/m²     Physical Exam  Vital signs - reviewed.  Exam of the right tibial area remained stable  There are 2 small open areas which will drain intermittently  There is no erythema  There is no warmth to touch  There is no significant tenderness  Was not able to express any significant drainage today  Photo was taken and placed in [facility]    Lab/Imaging/Other Information:  X-ray results reviewed and discussed with him.  Findings appear to be stable.  XR Tibia Fibula 2 View Right (12/09/2024 14:36)   IMPRESSION:  1. Persistent but improving long segment of sclerosis involving the  tibial diaphysis and central lucency likely related to chronic  osteomyelitis, improved.  2. Chronic right fibular fracture with callus formation.     This report was signed and finalized on 12/9/2024 3:56 PM by René Melendez.    Lab results outlined below were discussed with him as well.  C-reactive Protein (12/09/2024 14:27)  CBC Auto Differential (12/09/2024 14:27)  Comprehensive Metabolic Panel (12/09/2024 14:27)    Impression & Recommendations: "   Diagnoses and all orders for this visit:    1. Chronic osteomyelitis of right tibia with draining sinus (Primary)  -     cefadroxil (DURICEF) 500 MG capsule; Take 1 capsule by mouth Every 12 (Twelve) Hours for 60 days.  Dispense: 60 capsule; Refill: 5    He has stable chronic osteomyelitis with MSSA involving the right tibial area.  He is doing well on his chronic suppressive antibiotic treatment.  He is going to meet with Dr. Gold in February 2 explore options for management of the chronic infection [place] the right tibial area.  I explained IV antibiotic treatment without combining with surgical debridement would not likely provide significant benefit.  He indicates we have discussed the risks and benefits of this in the past.  He would like to have any surgery done here locally if possible should the need arise or he want to proceed with more definitive treatment as opposed to chronic suppressive therapy.  Explained we could decrease his pill burden by changing from Keflex without milligrams orally every 12 hours to Duricef 500 mg every 12 hours.  He is going to make the switch with his next refill when he runs out of his current Keflex supply.  Discontinue Keflex when he runs out of his current supply.  Then begin Duricef 500 mg every 12 hours - 1 month-5 refills  Follow-up appointment in 6 months  Would be happy to see sooner if any new or worsening problems in the interim  He was comfortable with plan we discussed    Follow Up:   Patient Instructions   Keep appointment with Dr. Mark Gold on 2/25/2025    Return in about 6 months (around 6/11/2025).  Patient was provided After Visit Summary.     Remigio Serrano MD      CC: MD Mark Lala MD

## 2024-12-09 ENCOUNTER — HOSPITAL ENCOUNTER (OUTPATIENT)
Dept: GENERAL RADIOLOGY | Facility: HOSPITAL | Age: 64
Discharge: HOME | End: 2024-12-09
Payer: COMMERCIAL

## 2024-12-09 ENCOUNTER — TELEPHONE (OUTPATIENT)
Age: 64
End: 2024-12-09
Payer: COMMERCIAL

## 2024-12-09 ENCOUNTER — LAB (OUTPATIENT)
Dept: LAB | Facility: HOSPITAL | Age: 64
End: 2024-12-09
Payer: COMMERCIAL

## 2024-12-09 DIAGNOSIS — M86.461 CHRONIC OSTEOMYELITIS OF RIGHT TIBIA WITH DRAINING SINUS: ICD-10-CM

## 2024-12-09 LAB
ALBUMIN SERPL-MCNC: 4.2 G/DL (ref 3.5–5.2)
ALBUMIN/GLOB SERPL: 1.3 G/DL
ALP SERPL-CCNC: 92 U/L (ref 39–117)
ALT SERPL W P-5'-P-CCNC: 26 U/L (ref 1–41)
ANION GAP SERPL CALCULATED.3IONS-SCNC: 13 MMOL/L (ref 5–15)
AST SERPL-CCNC: 20 U/L (ref 1–40)
AUTO MIXED CELLS #: 1 10*3/MM3 (ref 0.1–2.6)
AUTO MIXED CELLS %: 10.9 % (ref 0.1–24)
BILIRUB SERPL-MCNC: 0.6 MG/DL (ref 0–1.2)
BUN SERPL-MCNC: 18 MG/DL (ref 8–23)
BUN/CREAT SERPL: 18.8 (ref 7–25)
CALCIUM SPEC-SCNC: 9.4 MG/DL (ref 8.6–10.5)
CHLORIDE SERPL-SCNC: 99 MMOL/L (ref 98–107)
CO2 SERPL-SCNC: 26 MMOL/L (ref 22–29)
CREAT SERPL-MCNC: 0.96 MG/DL (ref 0.76–1.27)
EGFRCR SERPLBLD CKD-EPI 2021: 87.7 ML/MIN/1.73
ERYTHROCYTE [DISTWIDTH] IN BLOOD BY AUTOMATED COUNT: 14.1 % (ref 12.3–15.4)
GLOBULIN UR ELPH-MCNC: 3.3 GM/DL
GLUCOSE SERPL-MCNC: 113 MG/DL (ref 65–99)
HCT VFR BLD AUTO: 43.8 % (ref 37.5–51)
HGB BLD-MCNC: 14.3 G/DL (ref 13–17.7)
LYMPHOCYTES # BLD AUTO: 2.7 10*3/MM3 (ref 0.7–3.1)
LYMPHOCYTES NFR BLD AUTO: 29.2 % (ref 19.6–45.3)
MCH RBC QN AUTO: 27.8 PG (ref 26.6–33)
MCHC RBC AUTO-ENTMCNC: 32.6 G/DL (ref 31.5–35.7)
MCV RBC AUTO: 85 FL (ref 79–97)
NEUTROPHILS NFR BLD AUTO: 5.6 10*3/MM3 (ref 1.7–7)
NEUTROPHILS NFR BLD AUTO: 59.9 % (ref 42.7–76)
PLATELET # BLD AUTO: 240 10*3/MM3 (ref 140–450)
PMV BLD AUTO: 8.2 FL (ref 6–12)
POTASSIUM SERPL-SCNC: 4.1 MMOL/L (ref 3.5–5.2)
PROT SERPL-MCNC: 7.5 G/DL (ref 6–8.5)
RBC # BLD AUTO: 5.15 10*6/MM3 (ref 4.14–5.8)
SODIUM SERPL-SCNC: 138 MMOL/L (ref 136–145)
WBC NRBC COR # BLD AUTO: 9.3 10*3/MM3 (ref 3.4–10.8)

## 2024-12-09 PROCEDURE — 80053 COMPREHEN METABOLIC PANEL: CPT

## 2024-12-09 PROCEDURE — 85025 COMPLETE CBC W/AUTO DIFF WBC: CPT

## 2024-12-09 PROCEDURE — 73590 X-RAY EXAM OF LOWER LEG: CPT

## 2024-12-09 PROCEDURE — 86140 C-REACTIVE PROTEIN: CPT

## 2024-12-09 NOTE — TELEPHONE ENCOUNTER
Patient called he stated that he was going to get his labs and xrays at [facility]. He also asked about pain mgt and I told him that he would need to contact his PCP and have them refer him to pain mgnt.    inability to tolerate po, abdominal bloating

## 2024-12-10 LAB — CRP SERPL-MCNC: 0.5 MG/DL (ref 0–0.5)

## 2024-12-11 ENCOUNTER — OFFICE VISIT (OUTPATIENT)
Age: 64
End: 2024-12-11
Payer: COMMERCIAL

## 2024-12-11 VITALS
BODY MASS INDEX: 35.34 KG/M2 | TEMPERATURE: 98 F | HEIGHT: 68 IN | SYSTOLIC BLOOD PRESSURE: 139 MMHG | WEIGHT: 233.2 LBS | HEART RATE: 75 BPM | DIASTOLIC BLOOD PRESSURE: 87 MMHG | OXYGEN SATURATION: 97 %

## 2024-12-11 DIAGNOSIS — M86.461 CHRONIC OSTEOMYELITIS OF RIGHT TIBIA WITH DRAINING SINUS: Primary | ICD-10-CM

## 2024-12-11 PROCEDURE — 99214 OFFICE O/P EST MOD 30 MIN: CPT | Performed by: INTERNAL MEDICINE

## 2024-12-11 RX ORDER — HYDROCHLOROTHIAZIDE 12.5 MG/1
1 TABLET ORAL DAILY
COMMUNITY

## 2024-12-11 RX ORDER — CEFADROXIL 500 MG/1
500 CAPSULE ORAL EVERY 12 HOURS
Qty: 60 CAPSULE | Refills: 5 | Status: SHIPPED | OUTPATIENT
Start: 2024-12-11 | End: 2025-02-09

## 2024-12-11 RX ORDER — CEPHALEXIN 500 MG/1
1000 CAPSULE ORAL EVERY 12 HOURS
COMMUNITY
Start: 2024-12-10

## 2025-01-03 ENCOUNTER — TELEPHONE (OUTPATIENT)
Age: 65
End: 2025-01-03
Payer: COMMERCIAL

## 2025-01-03 NOTE — TELEPHONE ENCOUNTER
Patient called he wanted to know if I would fax his xray results to TRAY Smith at [facility] I told him that they are on EPIC and they can see his office notes,labs and radiology. He thanked me for my help.

## 2025-01-21 ENCOUNTER — HOSPITAL ENCOUNTER (OUTPATIENT)
Dept: PAIN MANAGEMENT | Age: 65
Discharge: HOME | End: 2025-01-21
Payer: COMMERCIAL

## 2025-01-21 ENCOUNTER — TELEPHONE (OUTPATIENT)
Dept: PAIN MANAGEMENT | Age: 65
End: 2025-01-21

## 2025-01-21 VITALS
TEMPERATURE: 97.6 F | RESPIRATION RATE: 18 BRPM | BODY MASS INDEX: 35.46 KG/M2 | SYSTOLIC BLOOD PRESSURE: 146 MMHG | HEART RATE: 69 BPM | OXYGEN SATURATION: 98 % | DIASTOLIC BLOOD PRESSURE: 82 MMHG | WEIGHT: 234 LBS | HEIGHT: 68 IN

## 2025-01-21 DIAGNOSIS — M17.11 PRIMARY OSTEOARTHRITIS OF RIGHT KNEE: ICD-10-CM

## 2025-01-21 DIAGNOSIS — G89.29 CHRONIC MIDLINE LOW BACK PAIN WITHOUT SCIATICA: Primary | ICD-10-CM

## 2025-01-21 DIAGNOSIS — M86.9 OSTEOMYELITIS OF RIGHT LOWER EXTREMITY: Chronic | ICD-10-CM

## 2025-01-21 DIAGNOSIS — M54.50 CHRONIC MIDLINE LOW BACK PAIN WITHOUT SCIATICA: Primary | ICD-10-CM

## 2025-01-21 PROCEDURE — 99204 OFFICE O/P NEW MOD 45 MIN: CPT

## 2025-01-21 PROCEDURE — 99215 OFFICE O/P EST HI 40 MIN: CPT

## 2025-01-21 RX ORDER — CEFADROXIL 500 MG/1
500 CAPSULE ORAL EVERY 12 HOURS
COMMUNITY
Start: 2024-12-11 | End: 2025-02-10

## 2025-01-21 RX ORDER — LOSARTAN POTASSIUM 25 MG/1
50 TABLET ORAL DAILY
COMMUNITY

## 2025-01-21 RX ORDER — HYDROCHLOROTHIAZIDE 12.5 MG/1
12.5 TABLET ORAL DAILY
COMMUNITY

## 2025-01-21 RX ORDER — CELECOXIB 200 MG/1
200 CAPSULE ORAL
Qty: 60 CAPSULE | Refills: 5 | Status: SHIPPED | OUTPATIENT
Start: 2025-01-21

## 2025-01-21 RX ORDER — NAPROXEN SODIUM 220 MG/1
220 TABLET, FILM COATED ORAL 2 TIMES DAILY WITH MEALS
COMMUNITY
End: 2025-01-21

## 2025-01-21 ASSESSMENT — PAIN DESCRIPTION - PAIN TYPE: TYPE: CHRONIC PAIN

## 2025-01-21 ASSESSMENT — ENCOUNTER SYMPTOMS
NAUSEA: 0
GASTROINTESTINAL NEGATIVE: 1
RESPIRATORY NEGATIVE: 1
SORE THROAT: 0
ABDOMINAL PAIN: 0
EYES NEGATIVE: 1
BACK PAIN: 1
CHANGE IN BOWEL HABIT: 0

## 2025-01-21 ASSESSMENT — PAIN DESCRIPTION - ORIENTATION
ORIENTATION: LOWER
ORIENTATION_2: OTHER (COMMENT)

## 2025-01-21 ASSESSMENT — PAIN DESCRIPTION - INTENSITY: RATING_2: 5

## 2025-01-21 ASSESSMENT — PAIN DESCRIPTION - LOCATION: LOCATION: BACK

## 2025-01-21 ASSESSMENT — PAIN SCALES - GENERAL: PAINLEVEL_OUTOF10: 5

## 2025-01-21 NOTE — PROGRESS NOTES
Clinic Documentation      Education Provided:  [x] Review of Darius  [] Agreement Review  [x] PEG Score Calculated [x] PHQ Score Calculated [x] ORT Score Calculated    [] Compliance Issues Discussed [] Cognitive Behavior Needs [x] Exercise [] Review of Test [] Financial Issues  [x] Tobacco/Alcohol Use Reviewed [x] Teaching [x] New Patient [] Picture Obtained    Physician Plan:  [] Outgoing Referral  [] Pharmacy Consult  [] Test Ordered [x] Prescription Ordered/Changed   [] Obtained Test Results / Consult Notes        Complete if patient is withholding blood thinner for procedure     Blood Thinner Patient is currently taking:      [] Plavix (Hold for 7 days)  [] Aspirin (Hold for 5 days)     [] Pletal (Hold for 2 days)  [] Pradaxa (Hold for 3 days)    [] Effient (Hold for 7 days)  [] Xarelto (Hold for 2 days)    [] Eliquis (Hold for 2 days)  [] Brilinta (Hold for 7 days)    [] Coumadin (Hold for 5 days) - (INR needs to be drawn the day prior to procedure- INR < 2.0)    [] Aggrenox (Hold for 7 days)        [] Patient will stop medication on their own.    [] Blood Thinner Form Faxed for approval to hold.   Provider form faxed to:     Assessment Completed by:  Electronically signed by Clau Neves MA on 1/21/2025 at 2:46 PM

## 2025-01-21 NOTE — TELEPHONE ENCOUNTER
Called patient to advise that Jayla put in an order for a XR. Advised patient that it is easier to get it done here but he can where he chooses, he will just need an order. Patient did not answer so I left a vm.

## 2025-01-21 NOTE — H&P
[facility]  [address] [address]  Phone: [phone]  Fax: [phone]        History and Physical New Patient    Patient Name: Mega James    MR #: [id number]    Account #:[id number]    : 1960    Age: 64 y.o.    Sex: male    Date: 25    PCP: Christiano Islas MD    Referring Provider:    Chief Complaint:   Chief Complaint   Patient presents with    Back Pain     5/10    Joint Pain     5/10       History of Present Illness:   The patient is a 64 y.o. male who presents with referral from PCP with primary complaints of chronic right knee pain. Had a previous fracture and reports that he overcompensated and in turn has caused arthritis. Patient is currently managed on OTC treatments- salonpas, naproxen, instaflex. Patient reports this therapy is not effective. He has previously seen pain management at  and the [facility].    Of note, patient has chronic osteomyelitis of right tibia since  and is on daily oral antibiotic therapy to keep this at baseline. Has not required any recent hospitalizations or wound care. He is managed by Dr. Diallo Kim of [facility]. Last office note reassuring that there was no progression. XR Tibia Fibula 2 View Right 2024 IMPRESSION: 1. Persistent but improving long segment of sclerosis involving the tibial diaphysis and central lucency likely related to chronic osteomyelitis, improved. 2. Chronic right fibular fracture with callus formation.     He reports that he has received steroid injections, PRP and other treatments for his knee in the past. States they were helpful and did not exacerbate his chronic infection. Has not had any known infection in knee joint. These were costly treatments and he stopped going due to cost. Since then, pain has worsened significantly.     He reports chronic back pain as well. Denies any radicular symptoms. Back pain is relieved when leaning forward, sitting on exercise

## 2025-01-30 ENCOUNTER — OFFICE VISIT (OUTPATIENT)
Age: 65
End: 2025-01-30
Payer: COMMERCIAL

## 2025-01-30 VITALS — HEIGHT: 68 IN | WEIGHT: 232.6 LBS | BODY MASS INDEX: 35.25 KG/M2

## 2025-01-30 DIAGNOSIS — M17.11 PRIMARY OSTEOARTHRITIS OF RIGHT KNEE: ICD-10-CM

## 2025-01-30 DIAGNOSIS — M86.9 OSTEOMYELITIS OF RIGHT LOWER EXTREMITY: Chronic | ICD-10-CM

## 2025-01-30 DIAGNOSIS — M25.561 ACUTE PAIN OF RIGHT KNEE: Primary | ICD-10-CM

## 2025-01-30 PROCEDURE — 1123F ACP DISCUSS/DSCN MKR DOCD: CPT | Performed by: ORTHOPAEDIC SURGERY

## 2025-01-30 PROCEDURE — 99205 OFFICE O/P NEW HI 60 MIN: CPT | Performed by: ORTHOPAEDIC SURGERY

## 2025-01-30 NOTE — PROGRESS NOTES
He has also tried PRP injections and umbilical injections with limited success. A referral will be made to Dr. Giordano for radiofrequency ablation (RFA) for the right knee.    2. Osteomyelitis.  The patient has a history of osteomyelitis in his right tibia, which has been draining since approximately 2012. He has undergone multiple surgeries, including a tendon transfer, and has been on suppressive antibiotics. Currently, he is on cephadroxil 500 mg twice a day. The patient has also been placed on Celebrex for pain management. A referral will be made to Dr. Tom for a consultation regarding a potential muscle flap for the right tibia. New dressings will be provided for wound care.    Follow-up  The patient will follow up in 3 months.    PROCEDURE  The patient has undergone several surgeries, including a tendon transfer due to nerve damage, which resulted in foot drop. He has also received injections and PRP therapy, with the latter providing some relief. Additionally, he has tried amniotic fluid injections but found PRP to be more effective.       Return in about 4 months (around 5/30/2025) for Right tibia-fibula x-ray.        Patient given educational materials - see patient instructions.   Discussed use, benefit, and side effects of prescribed medications.  All patient questions answered.  Pt voiced understanding. Patient agreed with treatment plan. Follow up as needed.    This dictation was generated by voice recognition computer software. Although all attempts are made to edit the dictation for accuracy, there may be errors in the transcription that are not intended.    Electronically signed by Ham Marquez MD on 1/30/2025 at 3:31 PM.    The patient (or guardian, if applicable) and other individuals in attendance with the patient were advised that Artificial Intelligence will be utilized during this visit to record, process the conversation to generate a clinical note, and support improvement of the AI

## 2025-02-04 ENCOUNTER — TELEPHONE (OUTPATIENT)
Dept: PAIN MANAGEMENT | Age: 65
End: 2025-02-04

## 2025-02-04 NOTE — TELEPHONE ENCOUNTER
Patient saw Jayla Aburto, MILA 1/21/25 and she ordered a XR right knee. Patient didn't get XR due to having an appointment on 1/30/25 with Dr. Marquez and the XR was done at his office. Chart Review has the XR results & office visit notes.

## 2025-02-05 DIAGNOSIS — L97.212 NON-PRESSURE CHRONIC ULCER OF RIGHT CALF WITH FAT LAYER EXPOSED (HCC): Chronic | ICD-10-CM

## 2025-02-05 DIAGNOSIS — M17.11 PRIMARY OSTEOARTHRITIS OF RIGHT KNEE: Primary | ICD-10-CM

## 2025-02-10 RX ORDER — CEPHALEXIN 500 MG/1
1000 CAPSULE ORAL EVERY 12 HOURS
Qty: 120 CAPSULE | OUTPATIENT
Start: 2025-02-10

## 2025-02-12 ENCOUNTER — HOSPITAL ENCOUNTER (OUTPATIENT)
Dept: PAIN MANAGEMENT | Age: 65
Discharge: HOME | End: 2025-02-12
Payer: COMMERCIAL

## 2025-02-12 VITALS
RESPIRATION RATE: 18 BRPM | HEART RATE: 68 BPM | SYSTOLIC BLOOD PRESSURE: 127 MMHG | OXYGEN SATURATION: 91 % | TEMPERATURE: 96.2 F | DIASTOLIC BLOOD PRESSURE: 91 MMHG

## 2025-02-12 PROCEDURE — 6360000002 HC RX W HCPCS

## 2025-02-12 PROCEDURE — 20610 DRAIN/INJ JOINT/BURSA W/O US: CPT | Performed by: STUDENT IN AN ORGANIZED HEALTH CARE EDUCATION/TRAINING PROGRAM

## 2025-02-12 PROCEDURE — 20611 DRAIN/INJ JOINT/BURSA W/US: CPT

## 2025-02-12 RX ORDER — LIDOCAINE HYDROCHLORIDE 10 MG/ML
2 INJECTION, SOLUTION EPIDURAL; INFILTRATION; INTRACAUDAL; PERINEURAL ONCE
Status: DISCONTINUED | OUTPATIENT
Start: 2025-02-12 | End: 2025-02-14 | Stop reason: HOSPADM

## 2025-02-12 NOTE — DISCHARGE INSTR - COC
Total Score        Discharging to Facility/ Agency   Name:   Address:  Phone:  Fax:    Dialysis Facility (if applicable)   Name:  Address:  Dialysis Schedule:  Phone:  Fax:    / signature: {Esignature:[id number]}    PHYSICIAN SECTION    Prognosis: {Prognosis:9953269173}    Condition at Discharge: { Patient Condition:753099379}    Rehab Potential (if transferring to Rehab): {Prognosis:1388162494}    Recommended Labs or Other Treatments After Discharge: ***    Physician Certification: I certify the above information and transfer of Mega James  is necessary for the continuing treatment of the diagnosis listed and that he requires {Admit to Appropriate Level of Care:01773} for {GREATER/LESS:586770509} 30 days.     Update Admission H&P: {CHP DME Changes in HandP:548371056}    PHYSICIAN SIGNATURE:  {Esignature:[id number]}

## 2025-02-12 NOTE — PROCEDURES
The patient's History and Physical  was reviewed with the patient and I examined the patient. There was no change. The surgical site was confirmed by the patient and me.            Plan: The risks, benefits, expected outcome, and alternative to the recommended procedure have been discussed with the patient. Patient understands and wants to proceed with the procedure.      ----------------------------------------------------------------------------------------------  Diagnosis:  Knee Osteoarthritis of the right knee    Proceduralist:  Irene Carson MD    Procedure: Right Knee Injection    After the suitable risks of the injection were discussed with the patient, including infection and elevated blood glucose levels, and there were no known allergies to the materials involved with the injection, the patient chose to proceed. The patient was placed in a sitting position with the knee bent at 90 degrees. The lateral joint space was palpated and the area marked . A 25g needle was used to anesthetize the skin and superficial structures. Next a 22g 1.5 inch needle was inserted over the joint space, approximately 1cm lateral to the patellar tendon and 1cm above the tibial plateau directed in a lateral to medial fashion towards the joint space. Next, synvisc was injected without resistance. The needle was removed and a dressing was applied. There were no immediate complications noted.    Electronically signed by Irene Carson MD on 2/12/2025 at 3:31 PM

## 2025-02-20 ENCOUNTER — TELEPHONE (OUTPATIENT)
Dept: PAIN MANAGEMENT | Age: 65
End: 2025-02-20

## 2025-02-20 NOTE — TELEPHONE ENCOUNTER
Follow up call placed with patient regarding R Knee Injections.     How much did the shot help?     40-50       %   For Median Branch Blocks-    Duration of shot?     Na        (Goal is 6-8 hours First Injection, 2 hours 2nd injection)  What is your current pain level now?  4/10                  Has your activity level increased since the shot?   No  Electronically signed by Nicole Powell RN on 2/20/2025 at 2:21 PM\

## 2025-04-03 ENCOUNTER — OFFICE VISIT (OUTPATIENT)
Dept: PAIN MANAGEMENT | Age: 65
End: 2025-04-03
Payer: COMMERCIAL

## 2025-04-03 VITALS
BODY MASS INDEX: 35.46 KG/M2 | SYSTOLIC BLOOD PRESSURE: 143 MMHG | WEIGHT: 234 LBS | HEIGHT: 68 IN | DIASTOLIC BLOOD PRESSURE: 85 MMHG | RESPIRATION RATE: 16 BRPM | OXYGEN SATURATION: 97 % | HEART RATE: 76 BPM | TEMPERATURE: 97.7 F

## 2025-04-03 DIAGNOSIS — M54.50 CHRONIC MIDLINE LOW BACK PAIN WITHOUT SCIATICA: ICD-10-CM

## 2025-04-03 DIAGNOSIS — G89.29 CHRONIC MIDLINE LOW BACK PAIN WITHOUT SCIATICA: ICD-10-CM

## 2025-04-03 DIAGNOSIS — M86.9 OSTEOMYELITIS OF RIGHT LOWER EXTREMITY: Chronic | ICD-10-CM

## 2025-04-03 DIAGNOSIS — M17.11 PRIMARY OSTEOARTHRITIS OF RIGHT KNEE: Primary | ICD-10-CM

## 2025-04-03 PROCEDURE — 1123F ACP DISCUSS/DSCN MKR DOCD: CPT

## 2025-04-03 PROCEDURE — 99214 OFFICE O/P EST MOD 30 MIN: CPT

## 2025-04-03 ASSESSMENT — ENCOUNTER SYMPTOMS
BACK PAIN: 1
EYES NEGATIVE: 1
GASTROINTESTINAL NEGATIVE: 1
RESPIRATORY NEGATIVE: 1
BOWEL INCONTINENCE: 0

## 2025-04-03 NOTE — ASSESSMENT & PLAN NOTE
- managed by ID- Dr. Kim  - patient has been extensively counseled on management options of knee pain - including injection, NSAIDs, opioids and bracing. He is aware of the risk of infection and furthering osteomyelitis with an injection and wishes to proceed. We will avoid corticosteroid injections due to the presence of osteomyelitis in the same extremity.

## 2025-04-03 NOTE — ASSESSMENT & PLAN NOTE
- Schedule patient for genicular nerve block of right knee with Dr. Irene Carson. Patient will not receive any other injections same day as genicular nerve block. If 2 successful blocks, plan is to proceed with RFA of genicular nerve.   - continue celebrex with food

## 2025-04-03 NOTE — PROGRESS NOTES
[facility]  [address]  [place] [address]  Phone: [phone]  Fax: [phone]        Follow Up Office Visit    Patient Name: Mega James    MR #: [id number]    Account #:    : 1960    Age: 64 y.o.    Sex: male    Date: 4/3/2025     PCP: Curly Colon APRN    Chief Complaint:   Chief Complaint   Patient presents with    Back Pain     5/10    Joint Pain     5/10       History of Present Illness:   The patient is a 64 y.o. male who presents for follow up on primary complaints of chronic back and right knee pain. Patient recently established care and is here to follow up on medication management and first procedure.     Patient had a right knee injection of Synvisc 1 on 25. Patient had only 40-55% relief of pain from procedure(s) for 3-4 weeks. Patient does not wish to repeat the injection.     Patient is currently managed on celebrex. Patient reports this therapy is effective. He denies any side effects of celebrex.     Knee Pain   There was no injury mechanism. The pain is present in the right knee. The quality of the pain is described as aching, burning and stabbing. The pain is at a severity of 5/10. The pain is moderate. The pain has been Constant since onset. Associated symptoms include an inability to bear weight and a loss of motion. Pertinent negatives include no muscle weakness, numbness or tingling. He reports no foreign bodies present. The symptoms are aggravated by movement. He has tried acetaminophen, heat, ice, NSAIDs and non-weight bearing for the symptoms. The treatment provided mild relief.   Back Pain  This is a chronic problem. The current episode started more than 1 year ago. The problem occurs constantly. The problem is unchanged. The pain is present in the lumbar spine. The quality of the pain is described as burning, aching and stabbing. The pain does not radiate. The pain is at a severity of 5/10. The pain is moderate. The pain is

## 2025-06-24 NOTE — PROGRESS NOTES
[facility] – [place] - Infectious Diseases Progress Note    Patient:  Lemuel Helm  YOB: 1960  MRN: [id number]   Primary Care Physician: Vince Spangler MD (Inactive)  Referring Physician: Vince Spangler MD     Chief Complaint: chronic right tibial osteomyelitis   >>> some drainage. Was taking Duricef one pill daily, he is now taking twice a day.... C Roxanna, MA<<<    Interval History/HPI: He returns today for follow-up.  He overall is stable.  He is being seen for chronic right tibial osteomyelitis.  He has 2 areas will drain intermittently.  He has not noticed redness.  He is not experiencing increasing tibial discomfort.  He does have some pain in joints.  He has some wear-and-tear arthritis changes.  He is going to talk with orthopedic surgery about any possibility for joint replacement surgery.  He is not having fevers or chills.  He indicates he was taking Duricef 1 tablet a day.  He indicates he had misread the bottle.  He indicates when he realized he had been taking the medicine incorrectly he started taking it twice a day.  He started doing this about a month ago.  He indicates he saw Dr. Gold shortly after he last saw me.  He indicates he was under the impression that cure of his infection would not be possible without extensive surgery.  He is not interested in the extensive surgery and muscle flap.  He indicates he is going to follow-up with Dr. Gold left symptoms progress and he changes his mind regarding an operation.  He does give the impression that Dr. Gold's recommendation were very similar to the surgeon that he had seen in [place].  He is interested in continuing chronic suppression without additional surgery at this time.  He is agreeable to laboratory work today.    Allergies:   Allergies   Allergen Reactions    Sulfa Antibiotics Rash     Current Scheduled Medications:   Current Outpatient Medications on File Prior to Visit   Medication Sig    ascorbic acid (VITAMIN C) 500 MG  "tablet Take 1 tablet by mouth Daily.    ASPIRIN 81 PO Take 81 mg by mouth Daily.    cefadroxil (DURICEF) 500 MG capsule Take 1 capsule by mouth 2 (Two) Times a Day.    Cyanocobalamin (Vitamin B-12 ER) 1000 MCG tablet controlled-release Take 1,000 mcg by mouth Daily.    Garlic 1000 MG capsule Take 1 capsule by mouth Daily.    hydroCHLOROthiazide 12.5 MG tablet Take 1 tablet by mouth Daily.    losartan (COZAAR) 25 MG tablet Take 1 tablet by mouth Daily. (Patient taking differently: Take 2 tablets by mouth Daily.)    Misc Natural Products (YumVs Beet Root-Tart Cherry) 250-0.5 MG chewable tablet Chew 1 Units Daily.    Naproxen (NAPROSYN PO) Take 1 tablet by mouth Daily As Needed. Takes Monday through Friday (Patient taking differently: Take 1 tablet by mouth Every Other Day. Takes Monday through Friday)    Zinc 50 MG tablet Take 1 tablet by mouth Daily.    cetirizine (zyrTEC) 10 MG tablet Take 1 tablet by mouth Daily. (Patient not taking: Reported on 12/11/2024)    [DISCONTINUED] cephalexin (KEFLEX) 500 MG capsule Take 2 capsules by mouth Every 12 (Twelve) Hours.     No current facility-administered medications on file prior to visit.      Venous Access Review  Line/IV site: No current IV Access  Antimicrobial Review  Currently on antibiotics/antifungals: YES/NO: YES  Start Date of Therapy: Amoxicillin 4/11/17-12/2020  cephalexin 12/20/20-  Duricef 12/12/2025  If therapy completed, date complete: suppression   Review of Systems See HPI.    Vital Signs:  /69 (BP Location: Left arm, Patient Position: Sitting, Cuff Size: Adult)   Pulse 69   Temp 98.9 °F (37.2 °C) (Oral)   Ht 172.7 cm (68\")   Wt 105 kg (231 lb 6.4 oz)   SpO2 97%   BMI 35.18 kg/m²     Physical Exam  Vital signs - reviewed.  Right tibial area without erythema  There are 2 open areas  With palpation there was not significant drainage today  There is no erythema  There is no fluctuance    Lab/Imaging/Other Information:     PROGRESS NOTES - SCAN - " TRAY AZUL - MARIOLA - 04/03/2025 (04/03/2025)     X-ray report below reviewed.  Orthopedic surgery note reviewed.    XR Knee 4+ View Right (01/30/2025 14:21 EST)   X-rays 1/30/2025 weightbearing AP bilateral knees flexed PA views of  bilateral knees lateral of right knee bilateral sunrise views show  Kellgren-Juan grade 4 changes medial compartment right knee with  bone-on-bone contact.  Patient has previous evidence of segmental fracture  of the tibia.  There is radiolucent lesions seen in the proximal third of  the tibia as well as the midshaft region of the tibia.  There is reactive  or sclerotic changes seen in this area as well there is also a healed  segmental arch healed fracture of the proximal third of the fibula.  There  is good union of the bone.  The changes are suspicious for some for  osteomyelitis of  Exam End: 01/30/25 14:21     ORTHOPEDIC - SCAN - JOSE RICE MD - MARIOLA - 01/30/2025 (01/30/2025)     Impression & Recommendations:   Diagnoses and all orders for this visit:    1. Chronic osteomyelitis of right tibia with draining sinus (Primary)  -     CBC & Differential; Future  -     Comprehensive Metabolic Panel; Future  -     C-reactive Protein; Future  -     cefadroxil (DURICEF) 500 MG capsule; Take 1 capsule by mouth Every 12 (Twelve) Hours for 60 days.  Dispense: 60 capsule; Refill: 5    He has chronic osteomyelitis involving the right tibia.  He has to draining sinus tracts.  Currently there is minimal drainage.  There is no evidence of progression at this time.  He seems to be tolerating his chronic suppression with Duricef without side effect.  He understands we will not achieve cure with antibiotic treatment alone.  He is not interested in additional surgery at present.  He would like to continue chronic suppression.  He seems to understand risks and benefits.  Laboratory work today including CBC, CBC, and CRP.  Continue Duricef 500 mg orally every 12 hours-stressed the  importance of taking the medicine twice daily.  He voiced understanding.  Call for earlier appointment if any new or worsening symptoms.  Otherwise follow-up in 6 months.  He was comfortable with that approach.    Follow Up:   There are no Patient Instructions on file for this visit.  Return in about 6 months (around 12/25/2025).  Patient was provided After Visit Summary.     Remigio Serrano MD      CC: TRAY Bertrand MD

## 2025-06-25 ENCOUNTER — LAB (OUTPATIENT)
Dept: LAB | Facility: HOSPITAL | Age: 65
End: 2025-06-25
Payer: COMMERCIAL

## 2025-06-25 ENCOUNTER — OFFICE VISIT (OUTPATIENT)
Age: 65
End: 2025-06-25
Payer: COMMERCIAL

## 2025-06-25 VITALS
SYSTOLIC BLOOD PRESSURE: 123 MMHG | HEART RATE: 69 BPM | OXYGEN SATURATION: 97 % | TEMPERATURE: 98.9 F | DIASTOLIC BLOOD PRESSURE: 69 MMHG | BODY MASS INDEX: 35.07 KG/M2 | WEIGHT: 231.4 LBS | HEIGHT: 68 IN

## 2025-06-25 DIAGNOSIS — M86.461 CHRONIC OSTEOMYELITIS OF RIGHT TIBIA WITH DRAINING SINUS: Primary | ICD-10-CM

## 2025-06-25 DIAGNOSIS — M86.461 CHRONIC OSTEOMYELITIS OF RIGHT TIBIA WITH DRAINING SINUS: ICD-10-CM

## 2025-06-25 LAB
ALBUMIN SERPL-MCNC: 4.2 G/DL (ref 3.5–5)
ALBUMIN/GLOB SERPL: 1.4 G/DL (ref 1.1–2.5)
ALP SERPL-CCNC: 86 U/L (ref 24–120)
ALT SERPL W P-5'-P-CCNC: 30 U/L (ref 0–50)
ANION GAP SERPL CALCULATED.3IONS-SCNC: 11 MMOL/L (ref 4–13)
AST SERPL-CCNC: 25 U/L (ref 7–45)
AUTO MIXED CELLS #: 0.9 10*3/MM3 (ref 0.1–2.6)
AUTO MIXED CELLS %: 12.4 % (ref 0.1–24)
BILIRUB SERPL-MCNC: 0.7 MG/DL (ref 0.1–1)
BUN SERPL-MCNC: 17 MG/DL (ref 5–21)
BUN/CREAT SERPL: 21.3
CALCIUM SPEC-SCNC: 8.9 MG/DL (ref 8.6–10.5)
CHLORIDE SERPL-SCNC: 102 MMOL/L (ref 98–110)
CO2 SERPL-SCNC: 22 MMOL/L (ref 24–31)
CREAT SERPL-MCNC: 0.8 MG/DL (ref 0.5–1.4)
CRP SERPL-MCNC: 0.46 MG/DL (ref 0–0.5)
EGFRCR SERPLBLD CKD-EPI 2021: 98.2 ML/MIN/1.73
ERYTHROCYTE [DISTWIDTH] IN BLOOD BY AUTOMATED COUNT: 14.4 % (ref 12.3–15.4)
GLOBULIN UR ELPH-MCNC: 3.1 GM/DL
GLUCOSE SERPL-MCNC: 109 MG/DL (ref 65–99)
HCT VFR BLD AUTO: 40.2 % (ref 37.5–51)
HGB BLD-MCNC: 13.4 G/DL (ref 13–17.7)
LYMPHOCYTES # BLD AUTO: 2.2 10*3/MM3 (ref 0.7–3.1)
LYMPHOCYTES NFR BLD AUTO: 28.3 % (ref 19.6–45.3)
MCH RBC QN AUTO: 28.3 PG (ref 26.6–33)
MCHC RBC AUTO-ENTMCNC: 33.3 G/DL (ref 31.5–35.7)
MCV RBC AUTO: 85 FL (ref 79–97)
NEUTROPHILS NFR BLD AUTO: 4.5 10*3/MM3 (ref 1.7–7)
NEUTROPHILS NFR BLD AUTO: 59.3 % (ref 42.7–76)
PLATELET # BLD AUTO: 162 10*3/MM3 (ref 140–450)
PMV BLD AUTO: 8.8 FL (ref 6–12)
POTASSIUM SERPL-SCNC: 4.1 MMOL/L (ref 3.5–5.3)
PROT SERPL-MCNC: 7.3 G/DL (ref 6.3–8.7)
RBC # BLD AUTO: 4.73 10*6/MM3 (ref 4.14–5.8)
SODIUM SERPL-SCNC: 135 MMOL/L (ref 135–145)
WBC NRBC COR # BLD AUTO: 7.6 10*3/MM3 (ref 3.4–10.8)

## 2025-06-25 PROCEDURE — 80053 COMPREHEN METABOLIC PANEL: CPT

## 2025-06-25 PROCEDURE — 85025 COMPLETE CBC W/AUTO DIFF WBC: CPT

## 2025-06-25 PROCEDURE — 86140 C-REACTIVE PROTEIN: CPT

## 2025-06-25 PROCEDURE — 36415 COLL VENOUS BLD VENIPUNCTURE: CPT

## 2025-06-25 PROCEDURE — 99214 OFFICE O/P EST MOD 30 MIN: CPT | Performed by: INTERNAL MEDICINE

## 2025-06-25 RX ORDER — CEFADROXIL 500 MG/1
500 CAPSULE ORAL EVERY 12 HOURS
Qty: 60 CAPSULE | Refills: 5 | Status: SHIPPED | OUTPATIENT
Start: 2025-06-25 | End: 2025-08-24

## 2025-06-25 RX ORDER — CEFADROXIL 500 MG/1
500 CAPSULE ORAL 2 TIMES DAILY
COMMUNITY

## 2025-07-23 ENCOUNTER — OFFICE VISIT (OUTPATIENT)
Age: 65
End: 2025-07-23
Payer: COMMERCIAL

## 2025-07-23 VITALS
HEART RATE: 60 BPM | HEIGHT: 68 IN | WEIGHT: 226 LBS | DIASTOLIC BLOOD PRESSURE: 82 MMHG | OXYGEN SATURATION: 98 % | BODY MASS INDEX: 34.25 KG/M2 | SYSTOLIC BLOOD PRESSURE: 134 MMHG | TEMPERATURE: 98.1 F

## 2025-07-23 DIAGNOSIS — I10 PRIMARY HYPERTENSION: ICD-10-CM

## 2025-07-23 DIAGNOSIS — Z13.220 LIPID SCREENING: ICD-10-CM

## 2025-07-23 DIAGNOSIS — Z87.891 PERSONAL HISTORY OF TOBACCO USE: ICD-10-CM

## 2025-07-23 DIAGNOSIS — Z11.59 ENCOUNTER FOR HEPATITIS C SCREENING TEST FOR LOW RISK PATIENT: ICD-10-CM

## 2025-07-23 DIAGNOSIS — Z00.00 ENCOUNTER FOR ROUTINE ADULT HEALTH EXAMINATION WITHOUT ABNORMAL FINDINGS: Primary | ICD-10-CM

## 2025-07-23 RX ORDER — LOSARTAN POTASSIUM 25 MG/1
50 TABLET ORAL DAILY
Qty: 180 TABLET | Refills: 1 | Status: SHIPPED | OUTPATIENT
Start: 2025-07-23

## 2025-07-23 RX ORDER — HYDROCHLOROTHIAZIDE 12.5 MG/1
12.5 TABLET ORAL DAILY
Qty: 90 TABLET | Refills: 1 | Status: SHIPPED | OUTPATIENT
Start: 2025-07-23

## 2025-07-23 RX ORDER — CELECOXIB 200 MG/1
200 CAPSULE ORAL DAILY
COMMUNITY
Start: 2025-07-20

## 2025-07-23 NOTE — PROGRESS NOTES
Chief Complaint   Patient presents with    Hypertension         History:  Lemuel Helm is a 64 y.o. male who presents today for evaluation of the above problems.      Well Adult Physical   Patient here for a comprehensive physical exam.The patient reports no problems    Do you take any herbs or supplements that were not prescribed by a doctor? no   Are you taking calcium supplements? no   Are you taking aspirin daily? no     History:  Patient does not receive prostate care outside our clinic  Date last prostate exam: never  Date last PSA: one year ago     Allergies   Allergen Reactions    Sulfa Antibiotics Rash     History reviewed. No pertinent past medical history.  History reviewed. No pertinent surgical history.  Family History   Problem Relation Age of Onset    Heart disease Mother     Cancer Father       reports that he quit smoking about 32 years ago. His smoking use included cigarettes. He started smoking about 45 years ago. He has a 13 pack-year smoking history. He has been exposed to tobacco smoke. He quit smokeless tobacco use about 27 years ago. He reports that he does not currently use alcohol. He reports that he does not use drugs.      Current Outpatient Medications:     ascorbic acid (VITAMIN C) 500 MG tablet, Take 1 tablet by mouth Daily., Disp: , Rfl:     ASPIRIN 81 PO, Take 81 mg by mouth Daily., Disp: , Rfl:     cefadroxil (DURICEF) 500 MG capsule, Take 1 capsule by mouth 2 (Two) Times a Day., Disp: , Rfl:     Cyanocobalamin (Vitamin B-12 ER) 1000 MCG tablet controlled-release, Take 1,000 mcg by mouth Daily., Disp: , Rfl:     Garlic 1000 MG capsule, Take 1 capsule by mouth Daily., Disp: , Rfl:     hydroCHLOROthiazide 12.5 MG tablet, Take 1 tablet by mouth Daily., Disp: 90 tablet, Rfl: 1    losartan (COZAAR) 25 MG tablet, Take 2 tablets by mouth Daily., Disp: 180 tablet, Rfl: 1    Misc Natural Products (YumVs Beet Root-Tart Cherry) 250-0.5 MG chewable tablet, Chew 1 Units Daily.,  "Disp: , Rfl:     Naproxen (NAPROSYN PO), Take 1 tablet by mouth Daily As Needed. Takes Monday through Friday (Patient taking differently: Take 1 tablet by mouth Every Other Day. Takes Monday through Friday), Disp: , Rfl:     Zinc 50 MG tablet, Take 1 tablet by mouth Daily., Disp: , Rfl:     celecoxib (CeleBREX) 200 MG capsule, Take 1 capsule by mouth Daily., Disp: , Rfl:        OBJECTIVE:  Visit Vitals  /82 (BP Location: Left arm, Patient Position: Sitting, Cuff Size: Adult)   Pulse 60   Temp 98.1 °F (36.7 °C) (Temporal)   Ht 172.7 cm (68\")   Wt 103 kg (226 lb)   SpO2 98%   BMI 34.36 kg/m²      Estimated body mass index is 34.36 kg/m² as calculated from the following:    Height as of this encounter: 172.7 cm (68\").    Weight as of this encounter: 103 kg (226 lb).      Physical Exam  Vitals and nursing note reviewed.   Constitutional:       General: He is awake.      Appearance: Normal appearance. He is well-developed and well-groomed. He is obese.   HENT:      Head: Normocephalic and atraumatic.      Right Ear: Hearing, tympanic membrane, ear canal and external ear normal.      Left Ear: Hearing, tympanic membrane, ear canal and external ear normal.      Nose: Nose normal.      Mouth/Throat:      Lips: Pink.      Mouth: Mucous membranes are moist.      Pharynx: Oropharynx is clear.   Eyes:      General: Lids are normal.      Extraocular Movements: Extraocular movements intact.      Conjunctiva/sclera: Conjunctivae normal.      Pupils: Pupils are equal, round, and reactive to light.   Cardiovascular:      Rate and Rhythm: Normal rate.      Pulses: Normal pulses.      Heart sounds: Normal heart sounds, S1 normal and S2 normal.   Pulmonary:      Effort: Pulmonary effort is normal.      Breath sounds: Normal breath sounds.   Abdominal:      General: Abdomen is flat. Bowel sounds are normal.      Palpations: Abdomen is soft.   Musculoskeletal:      Cervical back: Full passive range of motion without pain, normal " range of motion and neck supple.      Right lower leg: No edema.      Left lower leg: No edema.   Skin:     General: Skin is warm.      Capillary Refill: Capillary refill takes less than 2 seconds.   Neurological:      Mental Status: He is alert and oriented to person, place, and time.      Sensory: Sensation is intact.      Motor: Motor function is intact.      Coordination: Coordination is intact.      Gait: Gait is intact.   Psychiatric:         Attention and Perception: Attention and perception normal.         Mood and Affect: Mood and affect normal.         Speech: Speech normal.         Behavior: Behavior normal. Behavior is cooperative.         Thought Content: Thought content normal.         Cognition and Memory: Cognition and memory normal.         Judgment: Judgment normal.         Result Review :                   Assessment/Plan    Diagnoses and all orders for this visit:    1. Encounter for routine adult health examination without abnormal findings (Primary)    2. Primary hypertension  -     hydroCHLOROthiazide 12.5 MG tablet; Take 1 tablet by mouth Daily.  Dispense: 90 tablet; Refill: 1  -     losartan (COZAAR) 25 MG tablet; Take 2 tablets by mouth Daily.  Dispense: 180 tablet; Refill: 1  -     CBC w AUTO Differential  -     Comprehensive metabolic panel  -     Lipid panel    3. Lipid screening  -     Lipid panel    4. Encounter for hepatitis C screening test for low risk patient  -     Hepatitis C Antibody    5. Personal history of tobacco use  -     [place] AAA screen limited; Future      Patient presents to the clinic today for yearly wellness physical.  Per our [place] records from [facility] the last 1 was July 2023 coding loss.  Patient has a history of hypertension, arthritis, and vitamin D deficiency.  Patient states that his blood pressure is well-controlled in the upper 130s over 80s at home and also has no cardiac symptoms at this time.  He is not exercising like I advised  him last visit however he does maintain a low-sodium diet he says.  Blood pressure is well-maintained in the clinic today.  We did reeducate on hypertension diet and lifestyle changes in the clinic today and to continue to keep an eye on his blood pressure daily and as needed.    Patient is not fasting today but we are going to go ahead and get routine labs for his yearly wellness at this time.  Will let him know when results are back in and any changes as needed.  He is going to go ahead and do a AAA screening based off of his smoking history age and being a male he is high risk and educated this with him so I will order that and let him know date of appointment and results when back into me.    Due to patient's underlying risk factors patient received preventative counseling.  Prevention was discussed with lifestyle modifications.  Addressed risk factor management with medications and targeted lifestyle prevention, based on patient preference and discussion.  Topics of today's discussion below.     Patient is interested in increasing her physical activity at this time to promote better health.  Patient is instructed in methods for increasing her overall activity level including traditional exercise as well as lower stress options such as aqua therapy, and even something like using a fitness tracker on the phone to increase that count.  Patient is advised to slowly increase exercise to increase tolerance.     Patient significantly overweight we did discuss diet at this time.  Patient is instructed in methods for reducing total caloric intake including reducing portion sizes, reducing empty calories, and healthier choices.  Patient is advised to eat multiple smaller meals throughout the day to avoid getting hungry as well as increased metabolism, and exercise as a complete component of weight loss.  Patient is also instructed on other dietary weight loss methods including intermittent fasting, low-carb diets, and  Mediterranean diet.  Patient is advised to slowly increase exercise to increase tolerance.     PHQ2 screening score of 1 in clinic today. No major concern at this time.     I spent 7 minutes on patient prevention counseling today in the clinic.       Education materials and an After Visit Summary were printed and given to the patient at discharge.    Return in about 6 months (around 1/23/2026) for Next scheduled follow up.

## 2025-07-24 ENCOUNTER — RESULTS FOLLOW-UP (OUTPATIENT)
Age: 65
End: 2025-07-24
Payer: COMMERCIAL

## 2025-07-24 LAB
ALBUMIN SERPL-MCNC: 4.4 G/DL (ref 3.9–4.9)
ALP SERPL-CCNC: 84 IU/L (ref 44–121)
ALT SERPL-CCNC: 22 IU/L (ref 0–44)
AST SERPL-CCNC: 22 IU/L (ref 0–40)
BASOPHILS # BLD AUTO: 0 X10E3/UL (ref 0–0.2)
BASOPHILS NFR BLD AUTO: 0 %
BILIRUB SERPL-MCNC: 0.7 MG/DL (ref 0–1.2)
BUN SERPL-MCNC: 15 MG/DL (ref 8–27)
BUN/CREAT SERPL: 16 (ref 10–24)
CALCIUM SERPL-MCNC: 9.4 MG/DL (ref 8.6–10.2)
CHLORIDE SERPL-SCNC: 100 MMOL/L (ref 96–106)
CHOLEST SERPL-MCNC: 167 MG/DL (ref 100–199)
CO2 SERPL-SCNC: 23 MMOL/L (ref 20–29)
CREAT SERPL-MCNC: 0.93 MG/DL (ref 0.76–1.27)
EGFRCR SERPLBLD CKD-EPI 2021: 91 ML/MIN/1.73
EOSINOPHIL # BLD AUTO: 0.1 X10E3/UL (ref 0–0.4)
EOSINOPHIL NFR BLD AUTO: 2 %
ERYTHROCYTE [DISTWIDTH] IN BLOOD BY AUTOMATED COUNT: 14.3 % (ref 11.6–15.4)
GLOBULIN SER CALC-MCNC: 2.6 G/DL (ref 1.5–4.5)
GLUCOSE SERPL-MCNC: 88 MG/DL (ref 70–99)
HCT VFR BLD AUTO: 44.4 % (ref 37.5–51)
HDLC SERPL-MCNC: 36 MG/DL
HGB BLD-MCNC: 14.3 G/DL (ref 13–17.7)
IMM GRANULOCYTES # BLD AUTO: 0 X10E3/UL (ref 0–0.1)
IMM GRANULOCYTES NFR BLD AUTO: 0 %
LDLC SERPL CALC-MCNC: 106 MG/DL (ref 0–99)
LYMPHOCYTES # BLD AUTO: 2.1 X10E3/UL (ref 0.7–3.1)
LYMPHOCYTES NFR BLD AUTO: 31 %
MCH RBC QN AUTO: 28.7 PG (ref 26.6–33)
MCHC RBC AUTO-ENTMCNC: 32.2 G/DL (ref 31.5–35.7)
MCV RBC AUTO: 89 FL (ref 79–97)
MONOCYTES # BLD AUTO: 0.7 X10E3/UL (ref 0.1–0.9)
MONOCYTES NFR BLD AUTO: 10 %
NEUTROPHILS # BLD AUTO: 3.8 X10E3/UL (ref 1.4–7)
NEUTROPHILS NFR BLD AUTO: 57 %
PLATELET # BLD AUTO: 209 X10E3/UL (ref 150–450)
POTASSIUM SERPL-SCNC: 4.7 MMOL/L (ref 3.5–5.2)
PROT SERPL-MCNC: 7 G/DL (ref 6–8.5)
RBC # BLD AUTO: 4.99 X10E6/UL (ref 4.14–5.8)
SODIUM SERPL-SCNC: 137 MMOL/L (ref 134–144)
TRIGL SERPL-MCNC: 139 MG/DL (ref 0–149)
VLDLC SERPL CALC-MCNC: 25 MG/DL (ref 5–40)
WBC # BLD AUTO: 6.8 X10E3/UL (ref 3.4–10.8)

## 2025-08-06 ENCOUNTER — TELEPHONE (OUTPATIENT)
Age: 65
End: 2025-08-06
Payer: COMMERCIAL